# Patient Record
Sex: FEMALE | Race: OTHER | HISPANIC OR LATINO | URBAN - METROPOLITAN AREA
[De-identification: names, ages, dates, MRNs, and addresses within clinical notes are randomized per-mention and may not be internally consistent; named-entity substitution may affect disease eponyms.]

---

## 2017-02-01 ENCOUNTER — EMERGENCY (EMERGENCY)
Facility: HOSPITAL | Age: 37
LOS: 1 days | Discharge: HOME | End: 2017-02-01
Attending: EMERGENCY MEDICINE
Payer: MEDICAID

## 2017-02-01 VITALS
OXYGEN SATURATION: 99 % | DIASTOLIC BLOOD PRESSURE: 78 MMHG | HEART RATE: 73 BPM | TEMPERATURE: 99 F | SYSTOLIC BLOOD PRESSURE: 150 MMHG | RESPIRATION RATE: 18 BRPM

## 2017-02-01 VITALS
DIASTOLIC BLOOD PRESSURE: 80 MMHG | OXYGEN SATURATION: 98 % | TEMPERATURE: 99 F | SYSTOLIC BLOOD PRESSURE: 155 MMHG | RESPIRATION RATE: 18 BRPM | HEART RATE: 76 BPM | WEIGHT: 156.09 LBS | HEIGHT: 64 IN

## 2017-02-01 DIAGNOSIS — Z90.710 ACQUIRED ABSENCE OF BOTH CERVIX AND UTERUS: Chronic | ICD-10-CM

## 2017-02-01 DIAGNOSIS — Z98.89 OTHER SPECIFIED POSTPROCEDURAL STATES: Chronic | ICD-10-CM

## 2017-02-01 DIAGNOSIS — Z98.51 TUBAL LIGATION STATUS: Chronic | ICD-10-CM

## 2017-02-01 LAB
APPEARANCE UR: CLEAR — SIGNIFICANT CHANGE UP
BILIRUB UR-MCNC: NEGATIVE — SIGNIFICANT CHANGE UP
COLOR SPEC: YELLOW — SIGNIFICANT CHANGE UP
DIFF PNL FLD: NEGATIVE — SIGNIFICANT CHANGE UP
GLUCOSE UR QL: NEGATIVE MG/DL — SIGNIFICANT CHANGE UP
KETONES UR-MCNC: NEGATIVE — SIGNIFICANT CHANGE UP
LEUKOCYTE ESTERASE UR-ACNC: NEGATIVE — SIGNIFICANT CHANGE UP
NITRITE UR-MCNC: NEGATIVE — SIGNIFICANT CHANGE UP
PH UR: 6.5 — SIGNIFICANT CHANGE UP (ref 4.8–8)
PROT UR-MCNC: NEGATIVE MG/DL — SIGNIFICANT CHANGE UP
SP GR SPEC: 1.01 — SIGNIFICANT CHANGE UP (ref 1.01–1.02)
UROBILINOGEN FLD QL: NEGATIVE MG/DL — SIGNIFICANT CHANGE UP

## 2017-02-01 PROCEDURE — 99283 EMERGENCY DEPT VISIT LOW MDM: CPT

## 2017-02-01 PROCEDURE — 87086 URINE CULTURE/COLONY COUNT: CPT

## 2017-02-01 PROCEDURE — 87081 CULTURE SCREEN ONLY: CPT

## 2017-02-01 PROCEDURE — 87880 STREP A ASSAY W/OPTIC: CPT

## 2017-02-01 PROCEDURE — 81003 URINALYSIS AUTO W/O SCOPE: CPT

## 2017-02-01 RX ORDER — IBUPROFEN 200 MG
400 TABLET ORAL ONCE
Qty: 0 | Refills: 0 | Status: COMPLETED | OUTPATIENT
Start: 2017-02-01 | End: 2017-02-01

## 2017-02-01 RX ADMIN — Medication 400 MILLIGRAM(S): at 12:40

## 2017-02-01 NOTE — ED STATDOCS - ENMT, MLM
Posterior pharynx extremely erythematous.  No exudate. No airway occlusion. Neck supple, non-tender,  no palpable nodes or masses.

## 2017-02-01 NOTE — ED STATDOCS - OBJECTIVE STATEMENT
37 y/o female with PSHx partial hysterectomy and tonsillectomy presents to ED c/o left-sided throat pain x 2 days. Associated chills, cough, and left ear pain. Pt also notes intermittent left flank pain x 1.5 weeks with +dysuria. Denies fever, v/d. Pt has been treating with Tylenol for pain. No further complaints at this time.

## 2017-02-01 NOTE — ED STATDOCS - PROGRESS NOTE DETAILS
PA NOTE: Pt seen by intake physician and orders/plan reviewed. PT is a 35yo female with PMHx of total hysterectomy and tonsillectomy presenting to ED with complaints of sore throat and left ear pain x 1 week. Pt reports left sided throat pain with pain when swallowing. Pt endorses associated chills. Pt took motrin and tylenol to relieve symptoms. Pt also reports L sided flank pain with dysuria x 2 weeks. Pt denies fever, rash, NV/D, cough, rhinorrhea, hematuria, abd pain, CP, SOB, recent travel. NKDA  PE: GEN: Awake, alert,  NAD, HEENT: NC/AT. EARS: BL canals clear. BL TM intact pearly gray.  EYES:clear, without injection or icterus. no discharge. MOUTH: MMM. pharynx with erythema without exudate. uvula midline. NOSE: septum midline. no discharge noted.  CARDIAC: Reg rate and rhythm, S1,S2, RRR  RESP: No distress noted. Lungs CTA bilaterally no wheeze, ronchi, rales. ABD: soft, supple, non-tender, no guarding. no CVAT. NEURO: AOx3, no focal deficits   PLAN: Pt is a 35yo female with pharyngitis. rapid strep and UA still pending. Will re-evaluate. PA NOTE: Pt seen by intake physician and orders/plan reviewed. PT is a 37yo female with PMHx of total hysterectomy and tonsillectomy presenting to ED with complaints of sore throat and left ear pain x 1 week. Pt reports left sided throat pain with pain when swallowing. Pt endorses associated chills. Pt took motrin and tylenol to relieve symptoms. Pt also reports L sided flank pain with dysuria x 2 weeks. Pt denies fever, rash, NV/D, cough, rhinorrhea, hematuria, abd pain, CP, SOB, recent travel. NKDA  PE: GEN: Awake, alert,  NAD, HEENT: NC/AT. EARS: BL canals clear. BL TM intact pearly gray.  EYES:clear, without injection or icterus. no discharge. MOUTH: MMM. pharynx with erythema without exudate. uvula midline. NOSE: septum midline. no discharge noted.  NECK: supple. L tender cervical LAD. CARDIAC: Reg rate and rhythm, S1,S2, RRR  RESP: No distress noted. Lungs CTA bilaterally no wheeze, ronchi, rales. ABD: soft, supple, non-tender, no guarding. no CVAT. NEURO: AOx3, no focal deficits   PLAN: Pt is a 37yo female with pharyngitis. rapid strep and UA still pending. Will re-evaluate. Pt is stable. UA negative. rapid strep negative.Will give motrin in ED. Advise pt to follow up with PMD. Pt verbalized understanding and agreement with plan and diagnosis. Will d/c

## 2017-02-01 NOTE — ED ADULT NURSE NOTE - OBJECTIVE STATEMENT
assumed care at 1145.  pt awake alert and oriented x3.  pt c/o sore throat x2 days.  pt also states she has intermittent left flank pain and burning upon urination x1week.  denies N/V/D or fevers at home.  skin warm and dry.  moving all extremities well. respirations even and unlabored.

## 2017-02-01 NOTE — ED STATDOCS - NS ED MD SCRIBE ATTENDING SCRIBE SECTIONS
DISPOSITION/PHYSICAL EXAM/HIV/HISTORY OF PRESENT ILLNESS/REVIEW OF SYSTEMS/VITAL SIGNS( Pullset)/PAST MEDICAL/SURGICAL/SOCIAL HISTORY

## 2017-02-02 LAB
CULTURE RESULTS: SIGNIFICANT CHANGE UP
SPECIMEN SOURCE: SIGNIFICANT CHANGE UP

## 2017-02-03 LAB
CULTURE RESULTS: SIGNIFICANT CHANGE UP
SPECIMEN SOURCE: SIGNIFICANT CHANGE UP

## 2017-09-15 ENCOUNTER — OUTPATIENT (OUTPATIENT)
Dept: OUTPATIENT SERVICES | Facility: HOSPITAL | Age: 37
LOS: 1 days | End: 2017-09-15
Payer: MEDICAID

## 2017-09-15 VITALS
HEART RATE: 65 BPM | WEIGHT: 161.6 LBS | HEIGHT: 61 IN | SYSTOLIC BLOOD PRESSURE: 137 MMHG | TEMPERATURE: 99 F | DIASTOLIC BLOOD PRESSURE: 93 MMHG | RESPIRATION RATE: 16 BRPM

## 2017-09-15 DIAGNOSIS — Z90.710 ACQUIRED ABSENCE OF BOTH CERVIX AND UTERUS: Chronic | ICD-10-CM

## 2017-09-15 DIAGNOSIS — Z98.89 OTHER SPECIFIED POSTPROCEDURAL STATES: Chronic | ICD-10-CM

## 2017-09-15 DIAGNOSIS — Z98.51 TUBAL LIGATION STATUS: Chronic | ICD-10-CM

## 2017-09-15 DIAGNOSIS — N81.10 CYSTOCELE, UNSPECIFIED: ICD-10-CM

## 2017-09-15 DIAGNOSIS — Z01.818 ENCOUNTER FOR OTHER PREPROCEDURAL EXAMINATION: ICD-10-CM

## 2017-09-15 LAB
ALBUMIN SERPL ELPH-MCNC: 4.4 G/DL — SIGNIFICANT CHANGE UP (ref 3.3–5.2)
ALP SERPL-CCNC: 56 U/L — SIGNIFICANT CHANGE UP (ref 40–120)
ALT FLD-CCNC: 17 U/L — SIGNIFICANT CHANGE UP
ANION GAP SERPL CALC-SCNC: 13 MMOL/L — SIGNIFICANT CHANGE UP (ref 5–17)
APPEARANCE UR: CLEAR — SIGNIFICANT CHANGE UP
APTT BLD: 31.6 SEC — SIGNIFICANT CHANGE UP (ref 27.5–37.4)
AST SERPL-CCNC: 18 U/L — SIGNIFICANT CHANGE UP
BILIRUB SERPL-MCNC: 0.2 MG/DL — LOW (ref 0.4–2)
BILIRUB UR-MCNC: NEGATIVE — SIGNIFICANT CHANGE UP
BLD GP AB SCN SERPL QL: SIGNIFICANT CHANGE UP
BUN SERPL-MCNC: 15 MG/DL — SIGNIFICANT CHANGE UP (ref 8–20)
CALCIUM SERPL-MCNC: 8.9 MG/DL — SIGNIFICANT CHANGE UP (ref 8.6–10.2)
CHLORIDE SERPL-SCNC: 105 MMOL/L — SIGNIFICANT CHANGE UP (ref 98–107)
CO2 SERPL-SCNC: 23 MMOL/L — SIGNIFICANT CHANGE UP (ref 22–29)
COLOR SPEC: YELLOW — SIGNIFICANT CHANGE UP
CREAT SERPL-MCNC: 0.57 MG/DL — SIGNIFICANT CHANGE UP (ref 0.5–1.3)
DIFF PNL FLD: ABNORMAL
EPI CELLS # UR: SIGNIFICANT CHANGE UP
GLUCOSE SERPL-MCNC: 84 MG/DL — SIGNIFICANT CHANGE UP (ref 70–115)
GLUCOSE UR QL: NEGATIVE MG/DL — SIGNIFICANT CHANGE UP
HCT VFR BLD CALC: 38.1 % — SIGNIFICANT CHANGE UP (ref 37–47)
HGB BLD-MCNC: 12.5 G/DL — SIGNIFICANT CHANGE UP (ref 12–16)
INR BLD: 0.96 RATIO — SIGNIFICANT CHANGE UP (ref 0.88–1.16)
KETONES UR-MCNC: NEGATIVE — SIGNIFICANT CHANGE UP
LEUKOCYTE ESTERASE UR-ACNC: ABNORMAL
MCHC RBC-ENTMCNC: 28.9 PG — SIGNIFICANT CHANGE UP (ref 27–31)
MCHC RBC-ENTMCNC: 32.8 G/DL — SIGNIFICANT CHANGE UP (ref 32–36)
MCV RBC AUTO: 88.2 FL — SIGNIFICANT CHANGE UP (ref 81–99)
NITRITE UR-MCNC: NEGATIVE — SIGNIFICANT CHANGE UP
PH UR: 6 — SIGNIFICANT CHANGE UP (ref 5–8)
PLATELET # BLD AUTO: 350 K/UL — SIGNIFICANT CHANGE UP (ref 150–400)
POTASSIUM SERPL-MCNC: 4 MMOL/L — SIGNIFICANT CHANGE UP (ref 3.5–5.3)
POTASSIUM SERPL-SCNC: 4 MMOL/L — SIGNIFICANT CHANGE UP (ref 3.5–5.3)
PROT SERPL-MCNC: 7.2 G/DL — SIGNIFICANT CHANGE UP (ref 6.6–8.7)
PROT UR-MCNC: NEGATIVE MG/DL — SIGNIFICANT CHANGE UP
PROTHROM AB SERPL-ACNC: 10.5 SEC — SIGNIFICANT CHANGE UP (ref 9.8–12.7)
RBC # BLD: 4.32 M/UL — LOW (ref 4.4–5.2)
RBC # FLD: 12.6 % — SIGNIFICANT CHANGE UP (ref 11–15.6)
RBC CASTS # UR COMP ASSIST: ABNORMAL /HPF (ref 0–4)
SODIUM SERPL-SCNC: 141 MMOL/L — SIGNIFICANT CHANGE UP (ref 135–145)
SP GR SPEC: 1.01 — SIGNIFICANT CHANGE UP (ref 1.01–1.02)
TYPE + AB SCN PNL BLD: SIGNIFICANT CHANGE UP
UROBILINOGEN FLD QL: NEGATIVE MG/DL — SIGNIFICANT CHANGE UP
WBC # BLD: 10.8 K/UL — SIGNIFICANT CHANGE UP (ref 4.8–10.8)
WBC # FLD AUTO: 10.8 K/UL — SIGNIFICANT CHANGE UP (ref 4.8–10.8)
WBC UR QL: SIGNIFICANT CHANGE UP

## 2017-09-15 PROCEDURE — 85730 THROMBOPLASTIN TIME PARTIAL: CPT

## 2017-09-15 PROCEDURE — 81001 URINALYSIS AUTO W/SCOPE: CPT

## 2017-09-15 PROCEDURE — 85610 PROTHROMBIN TIME: CPT

## 2017-09-15 PROCEDURE — 80053 COMPREHEN METABOLIC PANEL: CPT

## 2017-09-15 PROCEDURE — 85027 COMPLETE CBC AUTOMATED: CPT

## 2017-09-15 PROCEDURE — 86850 RBC ANTIBODY SCREEN: CPT

## 2017-09-15 PROCEDURE — 93010 ELECTROCARDIOGRAM REPORT: CPT

## 2017-09-15 PROCEDURE — 87086 URINE CULTURE/COLONY COUNT: CPT

## 2017-09-15 PROCEDURE — 86900 BLOOD TYPING SEROLOGIC ABO: CPT

## 2017-09-15 PROCEDURE — 87186 SC STD MICRODIL/AGAR DIL: CPT

## 2017-09-15 PROCEDURE — G0463: CPT

## 2017-09-15 PROCEDURE — 36415 COLL VENOUS BLD VENIPUNCTURE: CPT

## 2017-09-15 PROCEDURE — 86901 BLOOD TYPING SEROLOGIC RH(D): CPT

## 2017-09-15 PROCEDURE — 93005 ELECTROCARDIOGRAM TRACING: CPT

## 2017-09-15 RX ORDER — HYDROCHLOROTHIAZIDE 25 MG
0 TABLET ORAL
Qty: 0 | Refills: 0 | COMMUNITY

## 2017-09-15 NOTE — H&P PST ADULT - NEGATIVE GENERAL GENITOURINARY SYMPTOMS
no renal colic/no dysuria/no urinary hesitancy/normal urinary frequency/no flank pain R/no urine discoloration/no nocturia/no gas in urine/no hematuria/no flank pain L/no bladder infections

## 2017-09-15 NOTE — H&P PST ADULT - NEGATIVE OPHTHALMOLOGIC SYMPTOMS
no lacrimation L/no diplopia/no blurred vision R/no lacrimation R/no blurred vision L/no discharge R/no pain L/no irritation R/no loss of vision R/no discharge L/no irritation L/no scleral injection R/no photophobia/no pain R/no loss of vision L/no scleral injection L

## 2017-09-15 NOTE — H&P PST ADULT - NEGATIVE FEMALE-SPECIFIC SYMPTOMS
no amenorrhea/no spotting/no pelvic pain/not applicable/no irregular menses/no vaginal discharge/no dysmenorrhea/no menorrhagia/no abnormal vaginal bleeding

## 2017-09-15 NOTE — H&P PST ADULT - NEGATIVE PSYCHIATRIC SYMPTOMS
no suicidal ideation/no memory loss/no auditory hallucinations/no visual hallucinations/no hyperactivity/no paranoia/no mood swings/no depression/no anxiety/no insomnia/no agitation

## 2017-09-15 NOTE — H&P PST ADULT - NEGATIVE SKIN SYMPTOMS
no rash/no hair loss/no dryness/no brittle nails/no itching/no change in size/color of mole/no tumor/no pitted nails

## 2017-09-15 NOTE — H&P PST ADULT - HISTORY OF PRESENT ILLNESS
36 y/o female s/p hysterectomy c/o female stress incontinence patient now presents for vaginal sling operation

## 2017-09-15 NOTE — H&P PST ADULT - NEGATIVE CARDIOVASCULAR SYMPTOMS
no palpitations/no orthopnea/no paroxysmal nocturnal dyspnea/no dyspnea on exertion/no peripheral edema/no chest pain

## 2017-09-15 NOTE — H&P PST ADULT - FAMILY HISTORY
Mother  Still living? Yes, Estimated age: 51-60  Family history of premature CAD, Age at diagnosis: Age Unknown  Family history of myocardial infarction at age less than 60, Age at diagnosis: Age Unknown

## 2017-09-15 NOTE — H&P PST ADULT - NEGATIVE BREAST SYMPTOMS
no nipple discharge R/no breast tenderness L/no breast lump L/no breast tenderness R/no breast lump R/no nipple discharge L

## 2017-09-15 NOTE — H&P PST ADULT - NSANTHOSAYNRD_GEN_A_CORE
No. MALDONADO screening performed.  STOP BANG Legend: 0-2 = LOW Risk; 3-4 = INTERMEDIATE Risk; 5-8 = HIGH Risk

## 2017-09-15 NOTE — H&P PST ADULT - MUSCULOSKELETAL
details… detailed exam no calf tenderness/normal strength/normal/ROM intact/no joint warmth/no joint swelling/no joint erythema

## 2017-09-15 NOTE — H&P PST ADULT - GASTROINTESTINAL DETAILS
normal/soft/no rebound tenderness/no rigidity/no distention/nontender/no masses palpable/no guarding/bowel sounds normal/no organomegaly/no bruit

## 2017-09-15 NOTE — H&P PST ADULT - NEGATIVE NEUROLOGICAL SYMPTOMS
no difficulty walking/no paresthesias/no syncope/no headache/no confusion/no facial palsy/no transient paralysis/no tremors/no weakness/no generalized seizures/no focal seizures/no loss of consciousness/no hemiparesis/no vertigo/no loss of sensation

## 2017-09-15 NOTE — H&P PST ADULT - NEGATIVE ENMT SYMPTOMS
no sinus symptoms/no hearing difficulty/no vertigo/no nasal congestion/no gum bleeding/no throat pain/no nasal discharge/no recurrent cold sores/no nasal obstruction/no post-nasal discharge/no abnormal taste sensation/no dry mouth/no ear pain/no tinnitus/no nose bleeds/no dysphagia

## 2017-09-15 NOTE — H&P PST ADULT - NEUROLOGICAL DETAILS
sensation intact/deep reflexes intact/cranial nerves intact/alert and oriented x 3/superficial reflexes intact/no spontaneous movement/responds to pain/responds to verbal commands/normal strength

## 2017-09-15 NOTE — H&P PST ADULT - NEGATIVE GASTROINTESTINAL SYMPTOMS
no vomiting/no diarrhea/no constipation/no abdominal pain/no melena/no jaundice/no nausea/no change in bowel habits/no flatulence/no hematochezia/no steatorrhea/no hiccoughs

## 2017-09-15 NOTE — H&P PST ADULT - NEGATIVE MUSCULOSKELETAL SYMPTOMS
no joint swelling/no muscle weakness/no muscle cramps/no leg pain L/no myalgia/no back pain/no arthralgia/no arthritis/no stiffness/no neck pain/no arm pain L/no arm pain R/no leg pain R

## 2017-09-15 NOTE — H&P PST ADULT - RS GEN PE MLT RESP DETAILS PC
no wheezes/normal/airway patent/breath sounds equal/good air movement/clear to auscultation bilaterally/no rales/respirations non-labored/no intercostal retractions/no chest wall tenderness/no rhonchi/no subcutaneous emphysema

## 2017-10-05 NOTE — ED ADULT NURSE NOTE - RESPIRATORY WDL
I have reviewed and confirmed nurses' notes...
Breathing spontaneous and unlabored. Breath sounds clear and equal bilaterally with regular rhythm.

## 2018-04-16 ENCOUNTER — EMERGENCY (EMERGENCY)
Facility: HOSPITAL | Age: 38
LOS: 1 days | Discharge: HOME | End: 2018-04-16
Attending: EMERGENCY MEDICINE
Payer: MEDICAID

## 2018-04-16 VITALS — HEIGHT: 64 IN | WEIGHT: 164.91 LBS

## 2018-04-16 VITALS
HEART RATE: 69 BPM | RESPIRATION RATE: 18 BRPM | DIASTOLIC BLOOD PRESSURE: 105 MMHG | TEMPERATURE: 98 F | OXYGEN SATURATION: 97 % | SYSTOLIC BLOOD PRESSURE: 158 MMHG

## 2018-04-16 DIAGNOSIS — Z98.51 TUBAL LIGATION STATUS: Chronic | ICD-10-CM

## 2018-04-16 DIAGNOSIS — Z90.710 ACQUIRED ABSENCE OF BOTH CERVIX AND UTERUS: Chronic | ICD-10-CM

## 2018-04-16 DIAGNOSIS — Z98.89 OTHER SPECIFIED POSTPROCEDURAL STATES: Chronic | ICD-10-CM

## 2018-04-16 LAB
APPEARANCE UR: CLEAR — SIGNIFICANT CHANGE UP
BILIRUB UR-MCNC: NEGATIVE — SIGNIFICANT CHANGE UP
COLOR SPEC: YELLOW — SIGNIFICANT CHANGE UP
DIFF PNL FLD: ABNORMAL
EPI CELLS # UR: SIGNIFICANT CHANGE UP
GLUCOSE UR QL: NEGATIVE MG/DL — SIGNIFICANT CHANGE UP
HCG UR QL: NEGATIVE — SIGNIFICANT CHANGE UP
KETONES UR-MCNC: NEGATIVE — SIGNIFICANT CHANGE UP
LEUKOCYTE ESTERASE UR-ACNC: NEGATIVE — SIGNIFICANT CHANGE UP
NITRITE UR-MCNC: NEGATIVE — SIGNIFICANT CHANGE UP
PH UR: 7 — SIGNIFICANT CHANGE UP (ref 5–8)
PROT UR-MCNC: NEGATIVE MG/DL — SIGNIFICANT CHANGE UP
S PYO AG SPEC QL IA: NEGATIVE — SIGNIFICANT CHANGE UP
SP GR SPEC: 1 — LOW (ref 1.01–1.02)
UROBILINOGEN FLD QL: NEGATIVE MG/DL — SIGNIFICANT CHANGE UP
WBC UR QL: SIGNIFICANT CHANGE UP

## 2018-04-16 PROCEDURE — 81001 URINALYSIS AUTO W/SCOPE: CPT

## 2018-04-16 PROCEDURE — 87081 CULTURE SCREEN ONLY: CPT

## 2018-04-16 PROCEDURE — 81025 URINE PREGNANCY TEST: CPT

## 2018-04-16 PROCEDURE — 99283 EMERGENCY DEPT VISIT LOW MDM: CPT

## 2018-04-16 PROCEDURE — 87880 STREP A ASSAY W/OPTIC: CPT

## 2018-04-16 NOTE — ED ADULT TRIAGE NOTE - CHIEF COMPLAINT QUOTE
"I haven't been feeling well since Saturday, I have lower back pain on my left side and my urine is very dark I also have a sore throat and ear pain" PT A & OX4 respirations even & unlabored,

## 2018-04-16 NOTE — ED ADULT NURSE NOTE - OBJECTIVE STATEMENT
Patient arrived to ED today with c/o left lower back pain.  Patient c/o throat pain, ear pain, and her urine is dark.

## 2018-04-16 NOTE — ED STATDOCS - OBJECTIVE STATEMENT
body aches and sharp pain in left lower back on saturday.  Took motrin 800mg and felt better.  Was ok yesterday.  Today with right ear pain, throat pain and dark yellow urine.  Feeling feverish.  Denies dysuria, denies fouls smelling urine.   Denies cough or runny nose.

## 2018-04-16 NOTE — ED STATDOCS - PLAN OF CARE
Tylenol extra strength 2 tablets every 4 hours or Ibuprofen 600mg (3 tablets) every 6 hours as needed for aches, pains. Warm salt water gargles as desired.  Return immediately to the ER for re-evaluation if your symptoms recur or worsening.

## 2018-04-16 NOTE — ED STATDOCS - CARE PLAN
Principal Discharge DX:	Pharyngitis, acute  Assessment and plan of treatment:	Tylenol extra strength 2 tablets every 4 hours or Ibuprofen 600mg (3 tablets) every 6 hours as needed for aches, pains. Warm salt water gargles as desired.  Return immediately to the ER for re-evaluation if your symptoms recur or worsening.

## 2018-04-18 LAB
CULTURE RESULTS: SIGNIFICANT CHANGE UP
SPECIMEN SOURCE: SIGNIFICANT CHANGE UP

## 2018-08-26 ENCOUNTER — EMERGENCY (EMERGENCY)
Facility: HOSPITAL | Age: 38
LOS: 1 days | Discharge: HOME | End: 2018-08-26
Attending: EMERGENCY MEDICINE
Payer: COMMERCIAL

## 2018-08-26 VITALS
HEART RATE: 62 BPM | HEIGHT: 64 IN | WEIGHT: 158.95 LBS | OXYGEN SATURATION: 100 % | DIASTOLIC BLOOD PRESSURE: 100 MMHG | RESPIRATION RATE: 18 BRPM | SYSTOLIC BLOOD PRESSURE: 159 MMHG | TEMPERATURE: 98 F

## 2018-08-26 DIAGNOSIS — Z98.89 OTHER SPECIFIED POSTPROCEDURAL STATES: Chronic | ICD-10-CM

## 2018-08-26 DIAGNOSIS — Z90.710 ACQUIRED ABSENCE OF BOTH CERVIX AND UTERUS: Chronic | ICD-10-CM

## 2018-08-26 DIAGNOSIS — Z98.51 TUBAL LIGATION STATUS: Chronic | ICD-10-CM

## 2018-08-26 PROBLEM — N20.0 CALCULUS OF KIDNEY: Chronic | Status: ACTIVE | Noted: 2017-09-15

## 2018-08-26 PROCEDURE — 70160 X-RAY EXAM OF NASAL BONES: CPT | Mod: 26

## 2018-08-26 PROCEDURE — 99283 EMERGENCY DEPT VISIT LOW MDM: CPT

## 2018-08-26 PROCEDURE — 70160 X-RAY EXAM OF NASAL BONES: CPT

## 2018-08-26 PROCEDURE — 99283 EMERGENCY DEPT VISIT LOW MDM: CPT | Mod: 25

## 2018-08-26 PROCEDURE — 90471 IMMUNIZATION ADMIN: CPT

## 2018-08-26 PROCEDURE — 90715 TDAP VACCINE 7 YRS/> IM: CPT

## 2018-08-26 RX ORDER — ACETAMINOPHEN 500 MG
650 TABLET ORAL ONCE
Qty: 0 | Refills: 0 | Status: COMPLETED | OUTPATIENT
Start: 2018-08-26 | End: 2018-08-26

## 2018-08-26 RX ORDER — IBUPROFEN 200 MG
600 TABLET ORAL ONCE
Qty: 0 | Refills: 0 | Status: COMPLETED | OUTPATIENT
Start: 2018-08-26 | End: 2018-08-26

## 2018-08-26 RX ORDER — IBUPROFEN 200 MG
1 TABLET ORAL
Qty: 28 | Refills: 0 | OUTPATIENT
Start: 2018-08-26 | End: 2018-09-01

## 2018-08-26 RX ORDER — TETANUS TOXOID, REDUCED DIPHTHERIA TOXOID AND ACELLULAR PERTUSSIS VACCINE, ADSORBED 5; 2.5; 8; 8; 2.5 [IU]/.5ML; [IU]/.5ML; UG/.5ML; UG/.5ML; UG/.5ML
0.5 SUSPENSION INTRAMUSCULAR ONCE
Qty: 0 | Refills: 0 | Status: COMPLETED | OUTPATIENT
Start: 2018-08-26 | End: 2018-08-26

## 2018-08-26 RX ADMIN — Medication 650 MILLIGRAM(S): at 12:12

## 2018-08-26 RX ADMIN — TETANUS TOXOID, REDUCED DIPHTHERIA TOXOID AND ACELLULAR PERTUSSIS VACCINE, ADSORBED 0.5 MILLILITER(S): 5; 2.5; 8; 8; 2.5 SUSPENSION INTRAMUSCULAR at 12:12

## 2018-08-26 RX ADMIN — Medication 600 MILLIGRAM(S): at 10:54

## 2018-08-26 RX ADMIN — Medication 1 TABLET(S): at 10:55

## 2018-08-26 NOTE — ED STATDOCS - OBJECTIVE STATEMENT
37 y/o female presents to the ED s/p bite from dog which onset 3 days ago. Pt states that her dog bit her on her face, when she woke him up from sleep. Dog is up-to-date with his vaccinations. Denies hand pain, fever, chills, chance of being pregnant or rash. No further complaints at this time.

## 2018-08-26 NOTE — ED STATDOCS - PROGRESS NOTE DETAILS
Pt reeval- reports continued pain- Will add tylenol. dT- unknown. XR neg for fx. Will dc home on Augmentin, Motrin and Tylenol. Educated on local wound care
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
## 2019-02-13 ENCOUNTER — EMERGENCY (EMERGENCY)
Facility: HOSPITAL | Age: 39
LOS: 1 days | Discharge: HOME | End: 2019-02-13
Attending: STUDENT IN AN ORGANIZED HEALTH CARE EDUCATION/TRAINING PROGRAM
Payer: COMMERCIAL

## 2019-02-13 VITALS
SYSTOLIC BLOOD PRESSURE: 154 MMHG | TEMPERATURE: 98 F | HEART RATE: 77 BPM | RESPIRATION RATE: 18 BRPM | OXYGEN SATURATION: 99 % | HEIGHT: 64 IN | DIASTOLIC BLOOD PRESSURE: 86 MMHG | WEIGHT: 149.91 LBS

## 2019-02-13 DIAGNOSIS — Z98.51 TUBAL LIGATION STATUS: Chronic | ICD-10-CM

## 2019-02-13 DIAGNOSIS — Z98.89 OTHER SPECIFIED POSTPROCEDURAL STATES: Chronic | ICD-10-CM

## 2019-02-13 DIAGNOSIS — Z90.710 ACQUIRED ABSENCE OF BOTH CERVIX AND UTERUS: Chronic | ICD-10-CM

## 2019-02-13 LAB
ALBUMIN SERPL ELPH-MCNC: 4.2 G/DL — SIGNIFICANT CHANGE UP (ref 3.3–5.2)
ALP SERPL-CCNC: 48 U/L — SIGNIFICANT CHANGE UP (ref 40–120)
ALT FLD-CCNC: 19 U/L — SIGNIFICANT CHANGE UP
ANION GAP SERPL CALC-SCNC: 13 MMOL/L — SIGNIFICANT CHANGE UP (ref 5–17)
APPEARANCE UR: CLEAR — SIGNIFICANT CHANGE UP
AST SERPL-CCNC: 18 U/L — SIGNIFICANT CHANGE UP
BASOPHILS # BLD AUTO: 0 K/UL — SIGNIFICANT CHANGE UP (ref 0–0.2)
BASOPHILS NFR BLD AUTO: 0.5 % — SIGNIFICANT CHANGE UP (ref 0–2)
BILIRUB SERPL-MCNC: 0.4 MG/DL — SIGNIFICANT CHANGE UP (ref 0.4–2)
BILIRUB UR-MCNC: NEGATIVE — SIGNIFICANT CHANGE UP
BUN SERPL-MCNC: 15 MG/DL — SIGNIFICANT CHANGE UP (ref 8–20)
CALCIUM SERPL-MCNC: 9.1 MG/DL — SIGNIFICANT CHANGE UP (ref 8.6–10.2)
CHLORIDE SERPL-SCNC: 102 MMOL/L — SIGNIFICANT CHANGE UP (ref 98–107)
CO2 SERPL-SCNC: 24 MMOL/L — SIGNIFICANT CHANGE UP (ref 22–29)
COLOR SPEC: YELLOW — SIGNIFICANT CHANGE UP
CREAT SERPL-MCNC: 0.68 MG/DL — SIGNIFICANT CHANGE UP (ref 0.5–1.3)
DIFF PNL FLD: NEGATIVE — SIGNIFICANT CHANGE UP
EOSINOPHIL # BLD AUTO: 0.1 K/UL — SIGNIFICANT CHANGE UP (ref 0–0.5)
EOSINOPHIL NFR BLD AUTO: 1.4 % — SIGNIFICANT CHANGE UP (ref 0–6)
GLUCOSE SERPL-MCNC: 102 MG/DL — SIGNIFICANT CHANGE UP (ref 70–115)
GLUCOSE UR QL: NEGATIVE MG/DL — SIGNIFICANT CHANGE UP
HCG UR QL: NEGATIVE — SIGNIFICANT CHANGE UP
HCT VFR BLD CALC: 39.7 % — SIGNIFICANT CHANGE UP (ref 37–47)
HGB BLD-MCNC: 13.3 G/DL — SIGNIFICANT CHANGE UP (ref 12–16)
KETONES UR-MCNC: NEGATIVE — SIGNIFICANT CHANGE UP
LEUKOCYTE ESTERASE UR-ACNC: NEGATIVE — SIGNIFICANT CHANGE UP
LIDOCAIN IGE QN: 39 U/L — SIGNIFICANT CHANGE UP (ref 22–51)
LYMPHOCYTES # BLD AUTO: 1.5 K/UL — SIGNIFICANT CHANGE UP (ref 1–4.8)
LYMPHOCYTES # BLD AUTO: 22.2 % — SIGNIFICANT CHANGE UP (ref 20–55)
MCHC RBC-ENTMCNC: 29.5 PG — SIGNIFICANT CHANGE UP (ref 27–31)
MCHC RBC-ENTMCNC: 33.5 G/DL — SIGNIFICANT CHANGE UP (ref 32–36)
MCV RBC AUTO: 88 FL — SIGNIFICANT CHANGE UP (ref 81–99)
MONOCYTES # BLD AUTO: 0.4 K/UL — SIGNIFICANT CHANGE UP (ref 0–0.8)
MONOCYTES NFR BLD AUTO: 6.2 % — SIGNIFICANT CHANGE UP (ref 3–10)
NEUTROPHILS # BLD AUTO: 4.6 K/UL — SIGNIFICANT CHANGE UP (ref 1.8–8)
NEUTROPHILS NFR BLD AUTO: 69.5 % — SIGNIFICANT CHANGE UP (ref 37–73)
NITRITE UR-MCNC: NEGATIVE — SIGNIFICANT CHANGE UP
PH UR: 6.5 — SIGNIFICANT CHANGE UP (ref 5–8)
PLATELET # BLD AUTO: 336 K/UL — SIGNIFICANT CHANGE UP (ref 150–400)
POTASSIUM SERPL-MCNC: 4 MMOL/L — SIGNIFICANT CHANGE UP (ref 3.5–5.3)
POTASSIUM SERPL-SCNC: 4 MMOL/L — SIGNIFICANT CHANGE UP (ref 3.5–5.3)
PROT SERPL-MCNC: 6.8 G/DL — SIGNIFICANT CHANGE UP (ref 6.6–8.7)
PROT UR-MCNC: NEGATIVE MG/DL — SIGNIFICANT CHANGE UP
RBC # BLD: 4.51 M/UL — SIGNIFICANT CHANGE UP (ref 4.4–5.2)
RBC # FLD: 12.4 % — SIGNIFICANT CHANGE UP (ref 11–15.6)
SODIUM SERPL-SCNC: 139 MMOL/L — SIGNIFICANT CHANGE UP (ref 135–145)
SP GR SPEC: 1.01 — SIGNIFICANT CHANGE UP (ref 1.01–1.02)
UROBILINOGEN FLD QL: NEGATIVE MG/DL — SIGNIFICANT CHANGE UP
WBC # BLD: 6.7 K/UL — SIGNIFICANT CHANGE UP (ref 4.8–10.8)
WBC # FLD AUTO: 6.7 K/UL — SIGNIFICANT CHANGE UP (ref 4.8–10.8)

## 2019-02-13 PROCEDURE — 76856 US EXAM PELVIC COMPLETE: CPT | Mod: 26

## 2019-02-13 PROCEDURE — 76830 TRANSVAGINAL US NON-OB: CPT

## 2019-02-13 PROCEDURE — 36415 COLL VENOUS BLD VENIPUNCTURE: CPT

## 2019-02-13 PROCEDURE — 81025 URINE PREGNANCY TEST: CPT

## 2019-02-13 PROCEDURE — 76830 TRANSVAGINAL US NON-OB: CPT | Mod: 26

## 2019-02-13 PROCEDURE — 74176 CT ABD & PELVIS W/O CONTRAST: CPT

## 2019-02-13 PROCEDURE — 74176 CT ABD & PELVIS W/O CONTRAST: CPT | Mod: 26

## 2019-02-13 PROCEDURE — 76856 US EXAM PELVIC COMPLETE: CPT

## 2019-02-13 PROCEDURE — 99285 EMERGENCY DEPT VISIT HI MDM: CPT

## 2019-02-13 PROCEDURE — 87086 URINE CULTURE/COLONY COUNT: CPT

## 2019-02-13 PROCEDURE — 81003 URINALYSIS AUTO W/O SCOPE: CPT

## 2019-02-13 PROCEDURE — 96374 THER/PROPH/DIAG INJ IV PUSH: CPT

## 2019-02-13 PROCEDURE — 85027 COMPLETE CBC AUTOMATED: CPT

## 2019-02-13 PROCEDURE — 96375 TX/PRO/DX INJ NEW DRUG ADDON: CPT

## 2019-02-13 PROCEDURE — 80053 COMPREHEN METABOLIC PANEL: CPT

## 2019-02-13 PROCEDURE — 83690 ASSAY OF LIPASE: CPT

## 2019-02-13 PROCEDURE — 99284 EMERGENCY DEPT VISIT MOD MDM: CPT | Mod: 25

## 2019-02-13 RX ORDER — IBUPROFEN 200 MG
1 TABLET ORAL
Qty: 15 | Refills: 0
Start: 2019-02-13 | End: 2019-02-17

## 2019-02-13 RX ORDER — SODIUM CHLORIDE 9 MG/ML
1000 INJECTION INTRAMUSCULAR; INTRAVENOUS; SUBCUTANEOUS ONCE
Qty: 0 | Refills: 0 | Status: COMPLETED | OUTPATIENT
Start: 2019-02-13 | End: 2019-02-13

## 2019-02-13 RX ORDER — ACETAMINOPHEN 500 MG
650 TABLET ORAL ONCE
Qty: 0 | Refills: 0 | Status: COMPLETED | OUTPATIENT
Start: 2019-02-13 | End: 2019-02-13

## 2019-02-13 RX ORDER — KETOROLAC TROMETHAMINE 30 MG/ML
30 SYRINGE (ML) INJECTION ONCE
Qty: 0 | Refills: 0 | Status: DISCONTINUED | OUTPATIENT
Start: 2019-02-13 | End: 2019-02-13

## 2019-02-13 RX ORDER — SODIUM CHLORIDE 9 MG/ML
3 INJECTION INTRAMUSCULAR; INTRAVENOUS; SUBCUTANEOUS ONCE
Qty: 0 | Refills: 0 | Status: COMPLETED | OUTPATIENT
Start: 2019-02-13 | End: 2019-02-13

## 2019-02-13 RX ORDER — TRAMADOL HYDROCHLORIDE 50 MG/1
1 TABLET ORAL
Qty: 12 | Refills: 0
Start: 2019-02-13 | End: 2019-02-15

## 2019-02-13 RX ORDER — MORPHINE SULFATE 50 MG/1
6 CAPSULE, EXTENDED RELEASE ORAL ONCE
Qty: 0 | Refills: 0 | Status: DISCONTINUED | OUTPATIENT
Start: 2019-02-13 | End: 2019-02-13

## 2019-02-13 RX ADMIN — Medication 30 MILLIGRAM(S): at 10:30

## 2019-02-13 RX ADMIN — MORPHINE SULFATE 6 MILLIGRAM(S): 50 CAPSULE, EXTENDED RELEASE ORAL at 12:30

## 2019-02-13 RX ADMIN — Medication 30 MILLIGRAM(S): at 10:20

## 2019-02-13 RX ADMIN — SODIUM CHLORIDE 1000 MILLILITER(S): 9 INJECTION INTRAMUSCULAR; INTRAVENOUS; SUBCUTANEOUS at 10:20

## 2019-02-13 RX ADMIN — Medication 650 MILLIGRAM(S): at 16:53

## 2019-02-13 RX ADMIN — SODIUM CHLORIDE 3 MILLILITER(S): 9 INJECTION INTRAMUSCULAR; INTRAVENOUS; SUBCUTANEOUS at 10:20

## 2019-02-13 RX ADMIN — MORPHINE SULFATE 6 MILLIGRAM(S): 50 CAPSULE, EXTENDED RELEASE ORAL at 12:09

## 2019-02-13 NOTE — ED STATDOCS - NS ED ROS FT
No fever/chills, No photophobia/eye pain/changes in vision, No ear pain/sore throat/dysphagia, No chest pain/palpitations, no SOB/cough/wheeze/stridor, (+) abdominal pain, No N/V/D, no dysuria/frequency/discharge, (+) back pain No neck, no rash, no changes in neurological status/function.

## 2019-02-13 NOTE — ED STATDOCS - OBJECTIVE STATEMENT
39 y/o F with PMHx of HTN and renal calculus presents to the ED c/o intermittent left sided abdominal pain, onset 1 day ago. Pt reports one episode of numbness to left arm that lasted for 1 minute; took Motrin with some relief of symptoms; last dose this morning. Pt states" I thought it was gas"; describes symptoms as stabbing sensation that radiates to her back. Denies being pregnant. Denies nausea, vomiting, diarrhea, fever, dysuria. No further complaints at this time.

## 2019-02-13 NOTE — ED ADULT NURSE NOTE - NSIMPLEMENTINTERV_GEN_ALL_ED
Implemented All Universal Safety Interventions:  [place] to call system. Call bell, personal items and telephone within reach. Instruct patient to call for assistance. Room bathroom lighting operational. Non-slip footwear when patient is off stretcher. Physically safe environment: no spills, clutter or unnecessary equipment. Stretcher in lowest position, wheels locked, appropriate side rails in place.

## 2019-02-13 NOTE — ED STATDOCS - CLINICAL SUMMARY MEDICAL DECISION MAKING FREE TEXT BOX
Pt with upper quadrant/ left flank pain started last night will check labs, CT to r/o kidney stones. Give IV fluids pain med and re-assess

## 2019-02-13 NOTE — ED STATDOCS - PHYSICAL EXAMINATION
Constitutional - well-developed; well nourished. Head - NCAT. Airway patent. Eyes - PERRL. CV - RRR. no murmur. no edema. Pulm - CTAB. Abd - soft, mild upper epigastric tenderness to palpation.. no rebound. no guarding. Neuro - A&Ox3. strength 5/5 x4. sensation intact x4. normal gait. Skin - No rash. MSK - normal ROM

## 2019-02-13 NOTE — ED STATDOCS - PROGRESS NOTE DETAILS
NP NOTE:  HPI, ROS, PE of intake doctor reviewed.  Sudden onset LUQ and left flank pain at 8pm last night.  On exam + CVA tenderness. UA unremarkable, labs and CT reviewed. NP NOTE:  Labs and Ct reviewed, 3.7cm ovarian cyst, patient crying in pain, will obtain US to r/o torsion.  I s/w US they will send for patient now. NP NOTE:  I s/w Dr. Harris, US with hemorrhagic cyst, no evidence of torsion.  Pain is controlled, will d/c home f/u with Dr. Valle.

## 2019-02-14 LAB
CULTURE RESULTS: NO GROWTH — SIGNIFICANT CHANGE UP
SPECIMEN SOURCE: SIGNIFICANT CHANGE UP

## 2019-02-18 ENCOUNTER — EMERGENCY (EMERGENCY)
Facility: HOSPITAL | Age: 39
LOS: 1 days | Discharge: HOME | End: 2019-02-18
Attending: EMERGENCY MEDICINE
Payer: COMMERCIAL

## 2019-02-18 VITALS
HEIGHT: 63 IN | SYSTOLIC BLOOD PRESSURE: 126 MMHG | HEART RATE: 79 BPM | TEMPERATURE: 98 F | WEIGHT: 154.1 LBS | RESPIRATION RATE: 18 BRPM | OXYGEN SATURATION: 97 % | DIASTOLIC BLOOD PRESSURE: 82 MMHG

## 2019-02-18 DIAGNOSIS — Z98.51 TUBAL LIGATION STATUS: Chronic | ICD-10-CM

## 2019-02-18 DIAGNOSIS — Z98.89 OTHER SPECIFIED POSTPROCEDURAL STATES: Chronic | ICD-10-CM

## 2019-02-18 DIAGNOSIS — Z90.710 ACQUIRED ABSENCE OF BOTH CERVIX AND UTERUS: Chronic | ICD-10-CM

## 2019-02-18 PROCEDURE — 99284 EMERGENCY DEPT VISIT MOD MDM: CPT

## 2019-02-18 PROCEDURE — 76856 US EXAM PELVIC COMPLETE: CPT | Mod: 26

## 2019-02-18 PROCEDURE — 76830 TRANSVAGINAL US NON-OB: CPT | Mod: 26

## 2019-02-18 RX ORDER — SODIUM CHLORIDE 9 MG/ML
3 INJECTION INTRAMUSCULAR; INTRAVENOUS; SUBCUTANEOUS ONCE
Qty: 0 | Refills: 0 | Status: COMPLETED | OUTPATIENT
Start: 2019-02-18 | End: 2019-02-18

## 2019-02-18 RX ORDER — CYCLOBENZAPRINE HYDROCHLORIDE 10 MG/1
10 TABLET, FILM COATED ORAL ONCE
Qty: 0 | Refills: 0 | Status: COMPLETED | OUTPATIENT
Start: 2019-02-18 | End: 2019-02-18

## 2019-02-18 RX ORDER — KETOROLAC TROMETHAMINE 30 MG/ML
30 SYRINGE (ML) INJECTION ONCE
Qty: 0 | Refills: 0 | Status: DISCONTINUED | OUTPATIENT
Start: 2019-02-18 | End: 2019-02-18

## 2019-02-18 NOTE — ED STATDOCS - ATTENDING CONTRIBUTION TO CARE
I, Faheem Torres, performed the initial face to face bedside interview with this patient regarding history of present illness, review of symptoms and relevant past medical, social and family history.  I completed an independent physical examination.  I was the initial provider who evaluated this patient. I have signed out the follow up of any pending tests (i.e. labs, radiological studies) to the ACP.  I have communicated the patient’s plan of care and disposition with the ACP.  The history, relevant review of systems, past medical and surgical history, medical decision making, and physical examination was documented by the scribe in my presence and I attest to the accuracy of the documentation.

## 2019-02-18 NOTE — ED ADULT TRIAGE NOTE - CHIEF COMPLAINT QUOTE
"I feel very bloated and pain in my back" "I have a "I have a cyst on my left ovary" c/o back pain and bloating starting Friday and difficulty urinating. Reports known Left ovarian cyst. Denies burning during urination. Pt states she contacted MD Valle who told her to come to ED. Denies vaginal bleeding. Able to ambulate with steady gait noted

## 2019-02-18 NOTE — ED STATDOCS - OBJECTIVE STATEMENT
37 y/o F pt with PMHx of HTN and renal calculi presents to the ED c/o back pain. Reports Denies No further complaints at this time.    I feel very bloated and pain in my back" "I have a "I have a cyst on my left ovary" c/o back pain and bloating starting Friday and difficulty urinating. Reports known Left ovarian cyst. Denies burning during urination. Pt states she contacted MD Valle who told her to come to ED. Denies vaginal bleeding. Able to ambulate with steady gait noted 39 y/o F pt with significant PMHx of HTN and renal calculi presents to the ED c/o lower back pain onset Friday. Pt states she contacted her OBGYN, Dr. Valle, who sent her to the ED for further evaluation. Pain radiates down her legs. Reports abdominal bloating, difficulty urinating,  Pt took Tramadol and Motrin which provided minimal to no relief. She states she has a cyst on her L ovary. Pt is able to ambulate. Denies burning upon urination, or vaginal bleeding. PSHx of partial hysterectomy. No allergies. No further complaints at this time.

## 2019-02-19 PROCEDURE — 99284 EMERGENCY DEPT VISIT MOD MDM: CPT | Mod: 25

## 2019-02-19 PROCEDURE — 76856 US EXAM PELVIC COMPLETE: CPT

## 2019-02-19 PROCEDURE — 96374 THER/PROPH/DIAG INJ IV PUSH: CPT

## 2019-02-19 PROCEDURE — 76830 TRANSVAGINAL US NON-OB: CPT

## 2019-02-19 RX ADMIN — SODIUM CHLORIDE 3 MILLILITER(S): 9 INJECTION INTRAMUSCULAR; INTRAVENOUS; SUBCUTANEOUS at 00:20

## 2019-02-19 RX ADMIN — Medication 30 MILLIGRAM(S): at 00:21

## 2019-02-19 RX ADMIN — CYCLOBENZAPRINE HYDROCHLORIDE 10 MILLIGRAM(S): 10 TABLET, FILM COATED ORAL at 00:21

## 2019-02-19 NOTE — ED ADULT NURSE NOTE - OBJECTIVE STATEMENT
Reports intermittent lower back pain since Wednesday with worsening pain, reports hx of left ovarian cyst, reports been prescribed tramadol and Motrin, took last dose ~1700 yesterday

## 2019-02-19 NOTE — ED ADULT NURSE NOTE - NSIMPLEMENTINTERV_GEN_ALL_ED
Implemented All Universal Safety Interventions:  [place] to call system. Call bell, personal items and telephone within reach. Instruct patient to call for assistance. Room bathroom lighting operational. Non-slip footwear when patient is off stretcher. Physically safe environment: no spills, clutter or unnecessary equipment. Stretcher in lowest position, wheels locked, appropriate side rails in place.

## 2019-02-19 NOTE — ED ADULT NURSE REASSESSMENT NOTE - NS ED NURSE REASSESS COMMENT FT1
Pt reports giving urin on arrival to ED, refusing to give any more samples, pt requesting PIV be removed, findings reported to MD Ureña, pt PIV remoeved, pt reports will continue to take at home pain medicaiton and f/u with Dr. Valle in am. Pt safely discharged at this time. Pt reports giving urin on arrival to ED, refusing to give any more samples, pt requesting PIV be removed, findings reported to MD Ureña, pt PIV removed, pt reports will continue to take at home pain medication and f/u with Dr. Valle in am. Pt safely discharged at this time, refuse to wait for d/c paperwork, refused discharge VS.

## 2019-05-03 ENCOUNTER — EMERGENCY (EMERGENCY)
Facility: HOSPITAL | Age: 39
LOS: 1 days | Discharge: HOME | End: 2019-05-03
Attending: EMERGENCY MEDICINE
Payer: COMMERCIAL

## 2019-05-03 VITALS
RESPIRATION RATE: 18 BRPM | WEIGHT: 151.9 LBS | OXYGEN SATURATION: 98 % | TEMPERATURE: 98 F | DIASTOLIC BLOOD PRESSURE: 88 MMHG | SYSTOLIC BLOOD PRESSURE: 149 MMHG | HEIGHT: 64 IN | HEART RATE: 74 BPM

## 2019-05-03 DIAGNOSIS — Z98.89 OTHER SPECIFIED POSTPROCEDURAL STATES: Chronic | ICD-10-CM

## 2019-05-03 DIAGNOSIS — Z98.51 TUBAL LIGATION STATUS: Chronic | ICD-10-CM

## 2019-05-03 DIAGNOSIS — Z90.710 ACQUIRED ABSENCE OF BOTH CERVIX AND UTERUS: Chronic | ICD-10-CM

## 2019-05-03 PROCEDURE — 99282 EMERGENCY DEPT VISIT SF MDM: CPT

## 2019-05-03 NOTE — ED STATDOCS - NS ED ROS FT
ROS: (+) fever, nasal congestion, throat pain, ear pain, rhinorrhea, body aches (-) No chills. No eye pain/changes in vision, dysphagia, No chest pain/palpitations. No SOB. No abdominal pain, N/V/D, no black/bloody bm. No dysuria/frequency/discharge, No headache. No Dizziness.    No rashes or breaks in skin. No numbness/tingling/weakness.

## 2019-05-03 NOTE — ED STATDOCS - PHYSICAL EXAMINATION
Gen: No acute distress, non toxic  HEENT: Mucous membranes moist, pink conjunctivae, EOMI  CV: RRR, nl s1/s2.  Resp: CTAB, normal rate and effort  GI: Abdomen soft, NT, ND. No rebound, no guarding  : No CVAT  Neuro: A&O x 3, moving all 4 extremities  MSK: No spine or joint tenderness to palpation  Skin: No rashes. intact and perfused. Gen: No acute distress, non toxic  HEENT: Mucous membranes moist, pink conjunctivae, EOMI. + nasal congestion, patent airway, no exudate, mild pharyngeal erythema. no lad. tm wnl b/l  Neck: supple  CV: RRR, nl s1/s2.  Resp: CTAB, normal rate and effort  GI: Abdomen soft, NT, ND. No rebound, no guarding  : No CVAT  Neuro: A&O x 3, moving all 4 extremities  MSK: No spine or joint tenderness to palpation  Skin: No rashes. intact and perfused.

## 2019-05-03 NOTE — ED STATDOCS - CLINICAL SUMMARY MEDICAL DECISION MAKING FREE TEXT BOX
likely uri, well appering, patent airway, afebrile, non toxic. continue supportive care and return precautions.

## 2019-05-03 NOTE — ED STATDOCS - OBJECTIVE STATEMENT
39 y/o F pt with hx of HTN, and partial hysterectomy presents to the ED c/o throat pain, with assoc. ear pain, nasal congestion, subjective fever, rhinorrhea, and body aches for 5 days. Pt states she felt her throat was on "fire," while at work yesterday; states her throat pain feels different from her prior hx of strep throat. Pt took Zyrtec for her symptoms with slight relief, pt has also been taking Motrin; last dose this AM. Pt is currently on Losartan. NKDA. Denies abdominal pain, vomiting, and diarrhea. Denies past hx of sinus infections. No further complaints at this time.

## 2020-02-20 NOTE — ED ADULT TRIAGE NOTE - BMI (KG/M2)
February 24, 2020         Patient: Chepe Alvarez   YOB: 1955           Chepe Alvarez has been under my care and needs to maintain better limb volume. He has an extensive history related to venous disease including multiple ulcerations. Mr. 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 He recently had a recurrence of stump ulceration along with venous insufficiency. In order to prevent further ulcerations related to venous insufficiency in his stump there would be significant benefit from maintaining limb volume, reduce moisture and improve the fit of the prosthesis. I think the vacuum suspended prosthesis will help in this regard.        Sincerely,         Kurt Tsang MD                                               25.7

## 2020-07-01 NOTE — ED ADULT TRIAGE NOTE - HEIGHT IN CM
Mrs. Wilkinson is a 57 y/o WF who was referred for positive fecal occult test on 6/2/2020 at her gynecologist office. She has never had a colonoscopy for colon cancer screening. She has a family history of colon cancer with her maternal grandfather at the age of 59. There is no history of colon polyps or cancer with first degree relatives. She also has a change in bowel pattern recently. She thinks it may be due to stress. It started at the end of April/early May with ribbon-like stool, sometimes loose, and sticky consistency. There was no melena. Around the beginning of June her bowel pattern started to improve and its nearly back to normal. Other than mildly increase fiber intake, no other changes were done. She has occasional bright red blood in a wipe-type pattern suggestive of hemorrhoids. She had hemorrhoidal banding done several years ago after her second pregnancy with dramatic improvement in hemorrhoidal symptoms of bleeding. Weight is stable. On review of records from her PCP, it appears she had elevated alkaline phosphatase 157 last May. She states she has had elevated liver enzymes in the past. Labs from 4/29/2020 with normal liver enzymes - alkaline phosphatase 134, AST 18, ALT 15. Impression/Plan:BR-Positive occult stool test, needs colonoscopy. BR-Change in bowel pattern, improving. This may be functional. Further evaluation depending on response to increased fiber intake. BR-Elevated alkaline phosphatase, intermittent. Will check GGT and isoenzymes to evaluate whether hepatic in origin. Further evaluation depending on results. If confirmed hepatic in origin, I discussed with her single most likely explanation of fatty liver given her risk factors of hypertension, hypercholesterolemia, obesity.   160.02

## 2020-09-09 NOTE — ED STATDOCS - CCCP TRG CHIEF CMPLNT
Form is ready to be sign by dr Flynn  
Patient dropped of blank disability forms in [place] to be completed. The form has been scanned into chart. Please call him when completed. [phone]  
cold symptoms

## 2020-09-20 ENCOUNTER — EMERGENCY (EMERGENCY)
Facility: HOSPITAL | Age: 40
LOS: 1 days | Discharge: HOME | End: 2020-09-20
Attending: EMERGENCY MEDICINE
Payer: MEDICAID

## 2020-09-20 VITALS
HEIGHT: 64 IN | TEMPERATURE: 98 F | RESPIRATION RATE: 18 BRPM | HEART RATE: 68 BPM | OXYGEN SATURATION: 99 % | DIASTOLIC BLOOD PRESSURE: 93 MMHG | WEIGHT: 134.92 LBS | SYSTOLIC BLOOD PRESSURE: 160 MMHG

## 2020-09-20 DIAGNOSIS — Z98.89 OTHER SPECIFIED POSTPROCEDURAL STATES: Chronic | ICD-10-CM

## 2020-09-20 DIAGNOSIS — Z98.51 TUBAL LIGATION STATUS: Chronic | ICD-10-CM

## 2020-09-20 DIAGNOSIS — Z90.710 ACQUIRED ABSENCE OF BOTH CERVIX AND UTERUS: Chronic | ICD-10-CM

## 2020-09-20 PROCEDURE — 99282 EMERGENCY DEPT VISIT SF MDM: CPT

## 2020-09-20 RX ORDER — HYDROCORTISONE 1 %
1 OINTMENT (GRAM) TOPICAL
Qty: 15 | Refills: 0
Start: 2020-09-20 | End: 2020-09-24

## 2020-09-20 RX ORDER — DIBUCAINE 1 %
1 OINTMENT (GRAM) RECTAL
Qty: 15 | Refills: 0
Start: 2020-09-20 | End: 2020-09-24

## 2020-09-20 RX ORDER — HYDROCHLOROTHIAZIDE 25 MG
0 TABLET ORAL
Qty: 0 | Refills: 0 | DISCHARGE

## 2020-09-20 RX ORDER — LOSARTAN POTASSIUM 100 MG/1
0 TABLET, FILM COATED ORAL
Qty: 0 | Refills: 0 | DISCHARGE

## 2020-09-20 NOTE — ED PROVIDER NOTE - CARE PROVIDER_API CALL
Deshaun Kincaid  COLON/RECTAL SURGERY  [address] [facility], [address] [place] [address]  Phone: [phone]  Fax: [phone]  Follow Up Time:

## 2020-09-20 NOTE — ED PROVIDER NOTE - PATIENT PORTAL LINK FT
You can access the FollowMyHealth Patient Portal offered by [facility] by registering at the following website: [email or web address]. By joining [facility]’s FollowMyHealth portal, you will also be able to view your health information using other applications (apps) compatible with our system.

## 2020-09-20 NOTE — ED PROVIDER NOTE - CLINICAL SUMMARY MEDICAL DECISION MAKING FREE TEXT BOX
41 y/o F with 24 hours of hemorrhoid with pain, no bleeding, no evidence of thrombosis.  Will rx Anusol, dibucaine, sitz baths, miralax and colace, refer to colorectal for f/u.

## 2020-09-20 NOTE — ED PROVIDER NOTE - NSFOLLOWUPINSTRUCTIONS_ED_ALL_ED_FT
1) USE ANUSOL AND DIBUCAINE AS DIRECTED  2) TAKE MIRALAX DAILY AND COLACE 3X/DAY TO SOFTEN STOOL  3) FILL BATHTUB OR LARGE CONTAINER WITH WARM WATER, SIT IN IT FOR 15 MINUTES 5-6X/DAY TO HELP SHRINK HEMORRHOID  4) FOLLOW UP WITH COLORECTAL IF THE HEMORRHOID PERSISTS

## 2020-09-20 NOTE — ED PROVIDER NOTE - OBJECTIVE STATEMENT
39 y/o F presents with c/p hemorrhoid that developed yesterday.  States that she has not been straining at stool or having rectal sex.  Today it is bigger and painful.  No Bleeding.  Has been using preparation h with little relief.

## 2020-09-20 NOTE — ED PROVIDER NOTE - ATTENDING CONTRIBUTION TO CARE
HPI: 39yo F p/w rectal pain x 1 day. No fevers/chills. Tried preparation H and ibuprofen with some relief.     PE:  Gen: NAD  Head: NCAT  HEENT: Oral mucosa moist, normal conjunctiva  CV: normal perfusion  Resp: no respiratory distress  Neuro: No focal neuro deficits  MSK: FROM all 4 extremities, no deformity  Skin: No rash, no bruising  Psych: Normal affect  Rectal: 1cm nonthrombosed external hemorrhoid at 6'oclock position on anus, +TTP     MDM: 39yo F with hemorrhoid- advised to use Sitz baths, anusol, given colorectal surgery f/u. Mitzi Wayne DO     I performed a history and physical exam of the patient and discussed their management with the advanced care provider. I reviewed the advanced care provider's note and agree with the documented findings and plan of care. My medical decision making and objective findings are found above.

## 2021-02-20 ENCOUNTER — EMERGENCY (EMERGENCY)
Facility: HOSPITAL | Age: 41
LOS: 1 days | Discharge: HOME | End: 2021-02-20
Payer: MEDICAID

## 2021-02-20 VITALS
RESPIRATION RATE: 18 BRPM | HEART RATE: 66 BPM | HEIGHT: 64 IN | OXYGEN SATURATION: 97 % | SYSTOLIC BLOOD PRESSURE: 142 MMHG | DIASTOLIC BLOOD PRESSURE: 71 MMHG | TEMPERATURE: 98 F

## 2021-02-20 DIAGNOSIS — Z98.89 OTHER SPECIFIED POSTPROCEDURAL STATES: Chronic | ICD-10-CM

## 2021-02-20 DIAGNOSIS — Z90.710 ACQUIRED ABSENCE OF BOTH CERVIX AND UTERUS: Chronic | ICD-10-CM

## 2021-02-20 DIAGNOSIS — Z98.51 TUBAL LIGATION STATUS: Chronic | ICD-10-CM

## 2021-02-20 LAB — SARS-COV-2 RNA SPEC QL NAA+PROBE: SIGNIFICANT CHANGE UP

## 2021-02-20 PROCEDURE — U0005: CPT

## 2021-02-20 PROCEDURE — 99283 EMERGENCY DEPT VISIT LOW MDM: CPT

## 2021-02-20 PROCEDURE — U0003: CPT

## 2021-02-20 PROCEDURE — 99282 EMERGENCY DEPT VISIT SF MDM: CPT

## 2021-02-20 NOTE — ED STATDOCS - PATIENT PORTAL LINK FT
You can access the FollowMyHealth Patient Portal offered by [facility] by registering at the following website: [email or web address]. By joining [employer]’s FollowMyHealth portal, you will also be able to view your health information using other applications (apps) compatible with our system.

## 2021-02-20 NOTE — ED STATDOCS - PHYSICAL EXAMINATION
General: Awake and alert  Eyes: clear bilaterally  Resp: even and unlabored  skin: warm and dry  psych: appropriate

## 2021-02-20 NOTE — ED STATDOCS - OBJECTIVE STATEMENT
41 y/o F was exposed to covid+ coworker 2 weeks ago, asymptomatic, requesting testing to retiurn to work.

## 2021-02-20 NOTE — ED STATDOCS - NS ED ROS FT
General: no fever or chills  Eyes: no redness or discharge  ENT: no nasal congestion, no sore throat  Cardiac: no CP, no palpitations  Respiratory: No cough, SOB, PINO, wheeze  Abd: no abd pain, N/V/D  Skin: no itch or rash

## 2021-02-25 NOTE — ED POST DISCHARGE NOTE - ADDITIONAL DOCUMENTATION
Pt presented to the ED asking for copy of neg COVID test, pt informed of neg after confirming identification with multiple identifiers.

## 2021-05-25 NOTE — ED STATDOCS - CONSTITUTIONAL APPEARANCE HYGIENE, MLM
Gracie Larson  [birth year]  5/25/21      HPI:    Patient presents for his routine visit. Since last visit he was put on glimepride for his sugar. He admits now his sugars at times going low, in 50s, not often. No neuropathy, no foot problems. Does admit to some polyuria but states has bene like this for many years, see Dr. Miguel Briseno in July. He also admits that over the past year, he has noticed his knees and R hip more weak and bothersome, states in AM he feel strong, then as he is active he seems to get more pain and feels like his legs get more tired. He did stop using weigth lifts for his legs, but still goes to cardiac rehab 3 days weekly. He denies numbness or tingling in legs, no lower back pain, no claudication. Past Medical History:   Diagnosis Date    Biliary stone     CAD (coronary artery disease) 2002    follows with Dr. Frias Rising yearly    Carotid artery stenosis     Dr. Lorena Ojeda per patient    Esophageal stricture     when eats has to eat slow    GERD (gastroesophageal reflux disease)     Hyperlipidemia     Hypertension     Pancreatic stent migration     Pancreatic stones     Pancreatitis     several times    Type 2 diabetes mellitus without complication ([facility] [address]         Past Surgical History:   Procedure Laterality Date    CORONARY ARTERY BYPASS GRAFT  2002    DIAGNOSTIC CARDIAC CATH LAB PROCEDURE  2002    ERCP      HERNIA REPAIR  2003    LITHOTRIPSY      OTHER SURGICAL HISTORY      pancreatic stent x2    UT LAP,CHOLECYSTECTOMY N/A 9/6/2018    CHOLECYSTECTOMY LAPAROSCOPIC ROBOTIC performed by Hema Crabtree MD at [facility] OR       Current Outpatient Medications   Medication Sig Dispense Refill    blood glucose test strips (FREESTYLE LITE) strip 1 each by Other route daily As needed.  50 each 5    JARDIANCE 10 MG tablet TAKE 1 TABLET EVERY DAY 90 tablet 1    metoprolol succinate (TOPROL XL) 50 MG extended release tablet TAKE 1 TABLET EVERY DAY 90 tablet 1    change in position or Valsalva. PMI non-displaced. UE and LE distal pulses intact. Abdomen:  Soft, NTND, NABS x 4, no masses or organomegaly, no rebound or guarding. MS: Normal, painless range of motion. No neurovascular deficit. There was some ttp along the R SI joint, but lumbar spine exam unremarkable otherwise, neg SLR, full ROM of the hips, knees and ankles, there was 4+ strength at the hips and knees with flexion. 5/5 strength in UE's/LE's/ bilaterally otherwise. Skin:  No abrasions, ecchymoses, or lacerations unless noted elsewhere. Extremities  No cyanosis, clubbing, or edema noted. Neurological:   Oriented. Motor functions intact. CN II-XII intact. DTRs UE and LE intact. Assessment/Plan:     [facility] was seen today for 6 month follow-up, hypertension, hyperlipidemia, diabetes and blood work. Diagnoses and all orders for this visit:    Type 2 diabetes mellitus without complication, without long-term current use of insulin ([address] appears to be in better control   -     blood glucose test strips (FREESTYLE LITE) strip; 1 each by Other route daily As needed. -     HEMOGLOBIN A1C; Future  -     MICROALBUMIN / CREATININE URINE RATIO; Future    Weakness of both legs  -     CK; Future  -     CBC; Future   -pt refsues any PT for this, will add back on his leg exercises, and I do feel this should help. But needs to keep in close touch. He refuses anything for the pain. Essential hypertension, controlled. -     COMPREHENSIVE METABOLIC PANEL; Future    Coronary artery disease involving native coronary artery of native heart with angina pectoris ([address] stables see cardiologist.     Hyperlipidemia, unspecified hyperlipidemia type,stable. -     COMPREHENSIVE METABOLIC PANEL; Future  -     LIPID PANEL; Future    Other orders  -     Cancel: PSA SCREENING; Future        Return in about 3 months (around 8/25/2021).         Counseled regarding above diagnosis, including possible risks and complications,  especially if left uncontrolled. Counseled regarding the possible side effects, risks, benefits and alternatives to treatment; patient and/or guardian verbalizes understanding, agrees, feels comfortable with and wishes to proceed with above treatment plan. Advised patient to call with any new medication issues, and read all Rx info from pharmacy to assure aware of all possible risks and side effects of medication before taking. Reviewed age and gender appropriate health screening exams and vaccinations. Advised patient regarding importance of keeping up with recommended health maintenance and to schedule as soon as possible if overdue, as this is important in assessing for undiagnosed pathology, especially cancer, as well as protecting against potentially harmful/life threatening disease. Patient and/or guardian verbalizes understanding and agrees with above counseling, assessment and plan. All questions answered.     Bartolo Greene PA-C well appearing

## 2021-05-25 NOTE — ED ADULT NURSE NOTE - THOUGHTS OF SUICIDE/SELF-HARM YN, MLM
Yes... Airway patent, Nasal mucosa clear. Mouth with normal mucosa. Throat has no vesicles, no oropharyngeal exudates and uvula is midline. No

## 2021-09-12 ENCOUNTER — EMERGENCY (EMERGENCY)
Facility: HOSPITAL | Age: 41
LOS: 1 days | Discharge: HOME | End: 2021-09-12
Attending: EMERGENCY MEDICINE
Payer: MEDICAID

## 2021-09-12 VITALS
RESPIRATION RATE: 18 BRPM | WEIGHT: 145.06 LBS | OXYGEN SATURATION: 98 % | HEIGHT: 64 IN | SYSTOLIC BLOOD PRESSURE: 147 MMHG | HEART RATE: 59 BPM | TEMPERATURE: 98 F | DIASTOLIC BLOOD PRESSURE: 85 MMHG

## 2021-09-12 DIAGNOSIS — Z98.51 TUBAL LIGATION STATUS: Chronic | ICD-10-CM

## 2021-09-12 DIAGNOSIS — Z90.710 ACQUIRED ABSENCE OF BOTH CERVIX AND UTERUS: Chronic | ICD-10-CM

## 2021-09-12 DIAGNOSIS — Z98.89 OTHER SPECIFIED POSTPROCEDURAL STATES: Chronic | ICD-10-CM

## 2021-09-12 LAB
ALBUMIN SERPL ELPH-MCNC: 4.5 G/DL — SIGNIFICANT CHANGE UP (ref 3.3–5.2)
ALP SERPL-CCNC: 63 U/L — SIGNIFICANT CHANGE UP (ref 40–120)
ALT FLD-CCNC: 29 U/L — SIGNIFICANT CHANGE UP
ANION GAP SERPL CALC-SCNC: 13 MMOL/L — SIGNIFICANT CHANGE UP (ref 5–17)
APPEARANCE UR: CLEAR — SIGNIFICANT CHANGE UP
AST SERPL-CCNC: 20 U/L — SIGNIFICANT CHANGE UP
BACTERIA # UR AUTO: NEGATIVE — SIGNIFICANT CHANGE UP
BASOPHILS # BLD AUTO: 0.09 K/UL — SIGNIFICANT CHANGE UP (ref 0–0.2)
BASOPHILS NFR BLD AUTO: 1.1 % — SIGNIFICANT CHANGE UP (ref 0–2)
BILIRUB SERPL-MCNC: <0.2 MG/DL — LOW (ref 0.4–2)
BILIRUB UR-MCNC: NEGATIVE — SIGNIFICANT CHANGE UP
BUN SERPL-MCNC: 12.1 MG/DL — SIGNIFICANT CHANGE UP (ref 8–20)
CALCIUM SERPL-MCNC: 9.2 MG/DL — SIGNIFICANT CHANGE UP (ref 8.6–10.2)
CHLORIDE SERPL-SCNC: 102 MMOL/L — SIGNIFICANT CHANGE UP (ref 98–107)
CO2 SERPL-SCNC: 25 MMOL/L — SIGNIFICANT CHANGE UP (ref 22–29)
COLOR SPEC: YELLOW — SIGNIFICANT CHANGE UP
CREAT SERPL-MCNC: 0.61 MG/DL — SIGNIFICANT CHANGE UP (ref 0.5–1.3)
DIFF PNL FLD: NEGATIVE — SIGNIFICANT CHANGE UP
EOSINOPHIL # BLD AUTO: 0.18 K/UL — SIGNIFICANT CHANGE UP (ref 0–0.5)
EOSINOPHIL NFR BLD AUTO: 2.3 % — SIGNIFICANT CHANGE UP (ref 0–6)
EPI CELLS # UR: NEGATIVE — SIGNIFICANT CHANGE UP
GLUCOSE SERPL-MCNC: 103 MG/DL — HIGH (ref 70–99)
GLUCOSE UR QL: NEGATIVE MG/DL — SIGNIFICANT CHANGE UP
HCG SERPL-ACNC: <4 MIU/ML — SIGNIFICANT CHANGE UP
HCT VFR BLD CALC: 42.4 % — SIGNIFICANT CHANGE UP (ref 34.5–45)
HGB BLD-MCNC: 13.7 G/DL — SIGNIFICANT CHANGE UP (ref 11.5–15.5)
IMM GRANULOCYTES NFR BLD AUTO: 0.5 % — SIGNIFICANT CHANGE UP (ref 0–1.5)
KETONES UR-MCNC: NEGATIVE — SIGNIFICANT CHANGE UP
LEUKOCYTE ESTERASE UR-ACNC: NEGATIVE — SIGNIFICANT CHANGE UP
LYMPHOCYTES # BLD AUTO: 1.88 K/UL — SIGNIFICANT CHANGE UP (ref 1–3.3)
LYMPHOCYTES # BLD AUTO: 23.7 % — SIGNIFICANT CHANGE UP (ref 13–44)
MCHC RBC-ENTMCNC: 29.2 PG — SIGNIFICANT CHANGE UP (ref 27–34)
MCHC RBC-ENTMCNC: 32.3 GM/DL — SIGNIFICANT CHANGE UP (ref 32–36)
MCV RBC AUTO: 90.4 FL — SIGNIFICANT CHANGE UP (ref 80–100)
MONOCYTES # BLD AUTO: 0.83 K/UL — SIGNIFICANT CHANGE UP (ref 0–0.9)
MONOCYTES NFR BLD AUTO: 10.5 % — SIGNIFICANT CHANGE UP (ref 2–14)
NEUTROPHILS # BLD AUTO: 4.9 K/UL — SIGNIFICANT CHANGE UP (ref 1.8–7.4)
NEUTROPHILS NFR BLD AUTO: 61.9 % — SIGNIFICANT CHANGE UP (ref 43–77)
NITRITE UR-MCNC: NEGATIVE — SIGNIFICANT CHANGE UP
PH UR: 6.5 — SIGNIFICANT CHANGE UP (ref 5–8)
PLATELET # BLD AUTO: 302 K/UL — SIGNIFICANT CHANGE UP (ref 150–400)
POTASSIUM SERPL-MCNC: 4.2 MMOL/L — SIGNIFICANT CHANGE UP (ref 3.5–5.3)
POTASSIUM SERPL-SCNC: 4.2 MMOL/L — SIGNIFICANT CHANGE UP (ref 3.5–5.3)
PROT SERPL-MCNC: 7.4 G/DL — SIGNIFICANT CHANGE UP (ref 6.6–8.7)
PROT UR-MCNC: 30 MG/DL
RBC # BLD: 4.69 M/UL — SIGNIFICANT CHANGE UP (ref 3.8–5.2)
RBC # FLD: 12.5 % — SIGNIFICANT CHANGE UP (ref 10.3–14.5)
RBC CASTS # UR COMP ASSIST: NEGATIVE /HPF — SIGNIFICANT CHANGE UP (ref 0–4)
SODIUM SERPL-SCNC: 140 MMOL/L — SIGNIFICANT CHANGE UP (ref 135–145)
SP GR SPEC: 1 — LOW (ref 1.01–1.02)
UROBILINOGEN FLD QL: NEGATIVE MG/DL — SIGNIFICANT CHANGE UP
WBC # BLD: 7.92 K/UL — SIGNIFICANT CHANGE UP (ref 3.8–10.5)
WBC # FLD AUTO: 7.92 K/UL — SIGNIFICANT CHANGE UP (ref 3.8–10.5)
WBC UR QL: NEGATIVE — SIGNIFICANT CHANGE UP

## 2021-09-12 PROCEDURE — 74177 CT ABD & PELVIS W/CONTRAST: CPT | Mod: 26,MD

## 2021-09-12 PROCEDURE — 74177 CT ABD & PELVIS W/CONTRAST: CPT | Mod: MD

## 2021-09-12 PROCEDURE — 99284 EMERGENCY DEPT VISIT MOD MDM: CPT

## 2021-09-12 PROCEDURE — 96374 THER/PROPH/DIAG INJ IV PUSH: CPT | Mod: XU

## 2021-09-12 PROCEDURE — 99284 EMERGENCY DEPT VISIT MOD MDM: CPT | Mod: 25

## 2021-09-12 PROCEDURE — 96375 TX/PRO/DX INJ NEW DRUG ADDON: CPT

## 2021-09-12 RX ORDER — IBUPROFEN 200 MG
1 TABLET ORAL
Qty: 21 | Refills: 0
Start: 2021-09-12 | End: 2021-09-18

## 2021-09-12 RX ORDER — ONDANSETRON 8 MG/1
4 TABLET, FILM COATED ORAL ONCE
Refills: 0 | Status: COMPLETED | OUTPATIENT
Start: 2021-09-12 | End: 2021-09-12

## 2021-09-12 RX ORDER — METHOCARBAMOL 500 MG/1
1500 TABLET, FILM COATED ORAL ONCE
Refills: 0 | Status: COMPLETED | OUTPATIENT
Start: 2021-09-12 | End: 2021-09-12

## 2021-09-12 RX ORDER — ACETAMINOPHEN 500 MG
650 TABLET ORAL ONCE
Refills: 0 | Status: COMPLETED | OUTPATIENT
Start: 2021-09-12 | End: 2021-09-12

## 2021-09-12 RX ORDER — KETOROLAC TROMETHAMINE 30 MG/ML
30 SYRINGE (ML) INJECTION ONCE
Refills: 0 | Status: DISCONTINUED | OUTPATIENT
Start: 2021-09-12 | End: 2021-09-12

## 2021-09-12 RX ORDER — SODIUM CHLORIDE 9 MG/ML
1000 INJECTION INTRAMUSCULAR; INTRAVENOUS; SUBCUTANEOUS ONCE
Refills: 0 | Status: COMPLETED | OUTPATIENT
Start: 2021-09-12 | End: 2021-09-12

## 2021-09-12 RX ADMIN — ONDANSETRON 4 MILLIGRAM(S): 8 TABLET, FILM COATED ORAL at 12:13

## 2021-09-12 RX ADMIN — Medication 30 MILLIGRAM(S): at 10:01

## 2021-09-12 RX ADMIN — SODIUM CHLORIDE 1000 MILLILITER(S): 9 INJECTION INTRAMUSCULAR; INTRAVENOUS; SUBCUTANEOUS at 10:01

## 2021-09-12 RX ADMIN — Medication 650 MILLIGRAM(S): at 12:13

## 2021-09-12 RX ADMIN — METHOCARBAMOL 1500 MILLIGRAM(S): 500 TABLET, FILM COATED ORAL at 10:30

## 2021-09-12 NOTE — ED PROVIDER NOTE - CLINICAL SUMMARY MEDICAL DECISION MAKING FREE TEXT BOX
female hx of kidney stones presenting with left flank pain and urinary symptoms. mild sciatica symptoms. + left LLQ and left CVAT on examination. labs fluids pain control and ct

## 2021-09-12 NOTE — ED ADULT TRIAGE NOTE - CHIEF COMPLAINT QUOTE
back pain starting yesterday. pt ambulatory. worse on the left side now radiating down left leg. denies trauma

## 2021-09-12 NOTE — ED PROVIDER NOTE - MUSCULOSKELETAL, MLM
Spine appears normal, no midline tenderness no point vertebral tenderness straight leg raise bialterally. range of motion is not limited, no muscle or joint tenderness

## 2021-09-12 NOTE — ED PROVIDER NOTE - CARE PROVIDER_API CALL
Delgado Brunner)  Urology  [address], [address]  [place] [address]  Phone: [phone]  Fax: [phone]  Follow Up Time: 4-6 Days

## 2021-09-12 NOTE — ED PROVIDER NOTE - PATIENT PORTAL LINK FT
You can access the FollowMyHealth Patient Portal offered by [facility] by registering at the following website: [email or web address]. By joining [employer]’s FollowMyHealth portal, you will also be able to view your health information using other applications (apps) compatible with our system.

## 2021-09-12 NOTE — ED PROVIDER NOTE - NSICDXPASTSURGICALHX_GEN_ALL_CORE_FT
PAST SURGICAL HISTORY:  H/O tubal ligation     S/P abdominoplasty 3/2015    S/P hysterectomy     S/P tonsillectomy

## 2021-09-12 NOTE — ED PROVIDER NOTE - NSICDXFAMILYHX_GEN_ALL_CORE_FT
FAMILY HISTORY:  Mother  Still living? Yes, Estimated age: 51-60  Family history of myocardial infarction at age less than 60, Age at diagnosis: Age Unknown  Family history of premature CAD, Age at diagnosis: Age Unknown

## 2021-09-12 NOTE — ED PROVIDER NOTE - NSFOLLOWUPINSTRUCTIONS_ED_ALL_ED_FT
Kidney Stones    Kidney stones (urolithiasis) are crystal deposits that form inside your kidneys. Pain is caused by the stone moving through the urinary tract, causing spasms of the ureter. Drink enough water and fluids to keep your urine clear or pale yellow. This will help you to pass the stone or stone fragments. If provided a strainer, strain all urine and keep all particulate matter and stones for a follow up appointment with a urologist.    SEEK IMMEDIATE MEDICAL CARE IF YOU HAVE ANY OF THE FOLLOWING SYMPTOMS: pain not controlled with medication, fever/chills, worsening vomiting, inability to urinate, or dizziness/lightheadedness.    Back Pain    Back pain is very common in adults. The cause of back pain is rarely dangerous and the pain often gets better over time. The cause of your back pain may not be known and may include strain of muscles or ligaments, degeneration of the spinal disks, or arthritis. Occasionally the pain may radiate down your leg(s). Over-the-counter medicines to reduce pain and inflammation are often the most helpful. Stretching and remaining active frequently helps the healing process.     SEEK IMMEDIATE MEDICAL CARE IF YOU HAVE ANY OF THE FOLLOWING SYMPTOMS: bowel or bladder control problems, unusual weakness or numbness in your arms or legs, nausea or vomiting, abdominal pain, fever, dizziness/lightheadedness.

## 2021-09-13 LAB
CULTURE RESULTS: SIGNIFICANT CHANGE UP
SPECIMEN SOURCE: SIGNIFICANT CHANGE UP

## 2021-09-24 ENCOUNTER — NON-APPOINTMENT (OUTPATIENT)
Age: 41
End: 2021-09-24

## 2021-09-24 ENCOUNTER — APPOINTMENT (OUTPATIENT)
Dept: UROLOGY | Facility: CLINIC | Age: 41
End: 2021-09-24
Payer: MEDICAID

## 2021-09-24 VITALS
WEIGHT: 172 LBS | BODY MASS INDEX: 29.37 KG/M2 | SYSTOLIC BLOOD PRESSURE: 169 MMHG | DIASTOLIC BLOOD PRESSURE: 104 MMHG | HEIGHT: 64 IN | HEART RATE: 59 BPM

## 2021-09-24 DIAGNOSIS — M54.5 LOW BACK PAIN: ICD-10-CM

## 2021-09-24 PROCEDURE — 99204 OFFICE O/P NEW MOD 45 MIN: CPT

## 2021-09-24 NOTE — PHYSICAL EXAM
[General Appearance - Well Developed] : well developed [General Appearance - Well Nourished] : well nourished [Normal Appearance] : normal appearance [Well Groomed] : well groomed [General Appearance - In No Acute Distress] : no acute distress [] : no respiratory distress [Edema] : no peripheral edema [Respiration, Rhythm And Depth] : normal respiratory rhythm and effort [Exaggerated Use Of Accessory Muscles For Inspiration] : no accessory muscle use [Abdomen Soft] : soft [Abdomen Tenderness] : non-tender [Normal Station and Gait] : the gait and station were normal for the patient's age [Oriented To Time, Place, And Person] : oriented to person, place, and time [Affect] : the affect was normal [Mood] : the mood was normal [Not Anxious] : not anxious [FreeTextEntry1] : see HPI

## 2021-09-24 NOTE — HISTORY OF PRESENT ILLNESS
[None] : no symptoms [1] : 1 [FreeTextEntry1] : 40 yo F for initial consultation\par no PMH or PSH\par NKDA\par \par no history of nephrolithiasis\par was seen in the ED due to flank pain\par - CT confirmed no renal or ureteral stones, had some left kidney fullness at the time\par - urinalysis - normal\par \par later complained of some low back pain that radiated bilaterally and changes with position\par now feeling better with the pain almost gone\par on exam has some very localized musculoskeletal pain on the left back \par no history of any trauma, and she is feeling much better\par \par reviewed the CT with patient, discussed the report and findings\par discussed the possibility she might have passed a small stone, but it might all be musculoskeletal pain\par no need for metabolic evaluation at this point\par no need for further urologic follow up

## 2022-07-11 NOTE — ED ADULT TRIAGE NOTE - BSA (M2)
Patient presents today for a colposcopy      Allergies as of 2022 - Reviewed 2022   Allergen Reaction Noted   • Demerol CARDIAC DISTURBANCES 2007     Medications verified and updated as needed.  Social History     Tobacco Use   Smoking Status Former Smoker   • Years: 2.00   • Types: Cigarettes   • Quit date: 3/9/2009   • Years since quittin.3   Smokeless Tobacco Never Used   Tobacco Comment    1 cigarette/day        Health Maintenance Due   Topic Date Due   • COVID-19 Vaccine (3 - Booster for Pfizer series) 2021                     
This office note has been dictated.  
1.71
20

## 2022-09-09 ENCOUNTER — EMERGENCY (EMERGENCY)
Facility: HOSPITAL | Age: 42
LOS: 1 days | Discharge: HOME | End: 2022-09-09
Attending: EMERGENCY MEDICINE
Payer: MEDICAID

## 2022-09-09 VITALS
HEIGHT: 64 IN | WEIGHT: 154.98 LBS | HEART RATE: 72 BPM | RESPIRATION RATE: 18 BRPM | OXYGEN SATURATION: 97 % | TEMPERATURE: 98 F | DIASTOLIC BLOOD PRESSURE: 92 MMHG | SYSTOLIC BLOOD PRESSURE: 132 MMHG

## 2022-09-09 DIAGNOSIS — Z98.89 OTHER SPECIFIED POSTPROCEDURAL STATES: Chronic | ICD-10-CM

## 2022-09-09 DIAGNOSIS — Z90.710 ACQUIRED ABSENCE OF BOTH CERVIX AND UTERUS: Chronic | ICD-10-CM

## 2022-09-09 DIAGNOSIS — Z98.51 TUBAL LIGATION STATUS: Chronic | ICD-10-CM

## 2022-09-09 PROCEDURE — 99283 EMERGENCY DEPT VISIT LOW MDM: CPT

## 2022-09-09 PROCEDURE — 99284 EMERGENCY DEPT VISIT MOD MDM: CPT | Mod: 25

## 2022-09-09 PROCEDURE — 10060 I&D ABSCESS SIMPLE/SINGLE: CPT

## 2022-09-09 RX ORDER — OXYCODONE AND ACETAMINOPHEN 5; 325 MG/1; MG/1
2 TABLET ORAL ONCE
Refills: 0 | Status: DISCONTINUED | OUTPATIENT
Start: 2022-09-09 | End: 2022-09-09

## 2022-09-09 RX ADMIN — Medication 100 MILLIGRAM(S): at 15:41

## 2022-09-09 RX ADMIN — OXYCODONE AND ACETAMINOPHEN 2 TABLET(S): 5; 325 TABLET ORAL at 15:41

## 2022-09-09 NOTE — ED ADULT NURSE NOTE - OBJECTIVE STATEMENT
Pt presents to ed c/o abscess on left labia since yesterday. Pt noticed it via pain after riding bicycle. Pt states hurts when moving or something touches it. Pt recently sexually active again.

## 2022-09-09 NOTE — ED PROVIDER NOTE - NSFOLLOWUPINSTRUCTIONS_ED_ALL_ED_FT
take antibiotics as prescribed.  Use warm compresses for 5 to 10 minutes every few hours for the next 2 days.  Tylenol extra strength 2 tablets every 6 hours or ibuprofen 600 mg every 6 hours for pain.  Follow-up with GYN as scheduled next week.  Return to the emergency department if worsening

## 2022-09-09 NOTE — ED ADULT NURSE NOTE - HIV OFFER
[address] [place] [address] Court  eMERGENCY dEPARTMENT eNCOUnter      Pt Name: Amanda Child  MRN: [id number]  [place] 2009  Date of evaluation: 6/25/2021  Provider: Randy Lind, [address] [place] [address]       Chief Complaint   Patient presents with    Rash     here with c/o an itchy rash all over body. HISTORY OF PRESENT ILLNESS   (Location/Symptom, Timing/Onset, Context/Setting, Quality, Duration, Modifying Factors, Severity)  Note limiting factors. Amanda Child is a 13 y.o. female who presents to the emergency department for itching all over. Family states that yesterday evening, she was using a different  to clean carpet and then vacuumed the room. She stayed with her sister and they were riding around trying to get the baby to sleep. She said that she started itching on her legs and now with rash all over her lower extremities, bilateral arms with some redness to her cheeks. No facial/lip/throat swelling. No trouble breathing. No change to detergents, soaps, clothing, etc. No history of reactions. No poison ivy or other plant exposure. Nursing Notes were reviewed. REVIEW OF SYSTEMS    (2-9 systems for level 4, 10 or more forlevel 5)     Review of Systems   Skin: Positive for color change and rash. All other systems reviewed and are negative. PAST MEDICAL HISTORY     Past Medical History:   Diagnosis Date    Asthma     Amarilis's bone disease     Leaky heart valve     Seasonal allergies          SURGICAL HISTORY       Past Surgical History:   Procedure Laterality Date    TONSILLECTOMY           CURRENT MEDICATIONS       Previous Medications    DICYCLOMINE (BENTYL) 20 MG TABLET    Take 1 tablet by mouth 3 times daily as needed (Abdominal pain)    ONDANSETRON (ZOFRAN ODT) 4 MG DISINTEGRATING TABLET    Take 1 tablet by mouth every 8 hours as needed for Nausea or Vomiting       ALLERGIES     Patient has no known allergies.     FAMILY HISTORY History reviewed. No pertinent family history. SOCIAL HISTORY       Social History     Socioeconomic History    Marital status: Single     Spouse name: None    Number of children: None    Years of education: None    Highest education level: None   Occupational History    None   Tobacco Use    Smoking status: Never Smoker   Substance and Sexual Activity    Alcohol use: None    Drug use: None    Sexual activity: None   Other Topics Concern    None   Social History Narrative    None     Social Determinants of Health     Financial Resource Strain:     Difficulty of Paying Living Expenses:    Food Insecurity:     Worried About Running Out of Food in the Last Year:     Ran Out of Food in the Last Year:    Transportation Needs:     Lack of Transportation (Medical):  Lack of Transportation (Non-Medical):    Physical Activity:     Days of Exercise per Week:     Minutes of Exercise per Session:    Stress:     Feeling of Stress :    Social Connections:     Frequency of Communication with Friends and Family:     Frequency of Social Gatherings with Friends and Family:     Attends Rastafarian Services:     Active Member of Clubs or Organizations:     Attends Club or Organization Meetings:     Marital Status:    Intimate Partner Violence:     Fear of Current or Ex-Partner:     Emotionally Abused:     Physically Abused:     Sexually Abused:        SCREENINGS             PHYSICAL EXAM    (up to 7 for level 4, 8 or more for level 5)     ED Triage Vitals [06/25/21 0533]   BP Temp Temp Source Heart Rate Resp SpO2 Height Weight - Scale   (!) 138/93 99.1 °F (37.3 °C) Oral 102 16 98 % 5' 6\" (1.676 m) (!) 262 lb (118.8 kg)       Physical Exam  Vitals and nursing note reviewed. Constitutional:       General: She is not in acute distress. Appearance: She is obese. HENT:      Head: Normocephalic. Mouth/Throat:      Mouth: Mucous membranes are moist.      Pharynx: Oropharynx is clear.    Eyes: Extraocular Movements: Extraocular movements intact. Conjunctiva/sclera: Conjunctivae normal.      Pupils: Pupils are equal, round, and reactive to light. Cardiovascular:      Rate and Rhythm: Normal rate and regular rhythm. Heart sounds: Normal heart sounds. Pulmonary:      Effort: Pulmonary effort is normal.      Breath sounds: Normal breath sounds. No wheezing. Musculoskeletal:         General: Normal range of motion. Cervical back: Neck supple. Skin:     General: Skin is warm and dry. Findings: Rash present. Neurological:      Mental Status: She is alert and oriented to person, place, and time. Psychiatric:         Mood and Affect: Mood normal.         Behavior: Behavior normal.         DIAGNOSTIC RESULTS     EKG: All EKG's are interpreted by the Emergency Department Physician who either signs or Co-signs this chart in the absence of a cardiologist.    None    RADIOLOGY:   Non-plain film images such as CT, Ultrasound and MRI are read by the radiologist. Plain radiographic images are visualized andpreliminarily interpreted by the emergency physician with the below findings:    None    Interpretationper the Radiologist below, if available at the time of this note:    No orders to display         ED BEDSIDE ULTRASOUND:   Performed by ED Physician - none    LABS:  Labs Reviewed - No data to display    All other labs were within normal range or not returned as of this dictation. EMERGENCY DEPARTMENT COURSE and DIFFERENTIAL DIAGNOSIS/MDM:   Vitals:    Vitals:    06/25/21 0533   BP: (!) 138/93   Pulse: 102   Resp: 16   Temp: 99.1 °F (37.3 °C)   TempSrc: Oral   SpO2: 98%   Weight: (!) 262 lb (118.8 kg)   Height: 5' 6\" (1.676 m)           CRITICAL CARE TIME   Total Critical Care time was 0 minutes, excluding separatelyreportable procedures.   There was a high probability ofclinically significant/life threatening deterioration in the patient's condition which required my urgent Previously Declined (within the last year)

## 2022-09-09 NOTE — ED PROVIDER NOTE - OBJECTIVE STATEMENT
Notes a lump to labia that started yesterday morning.  Took Motrin later in the evening and had pain relief for short period of time.  Reports that pain has increased and lump is getting bigger.  Denies fever.  Denies vaginal discharge.  Denies dysuria.  Denies history of urinary tract infection or history of sexually transmitted illness. arranged appointment with GYN for next Wednesday but reports difficulty wiping self and increased pain

## 2022-09-09 NOTE — ED PROVIDER NOTE - PATIENT PORTAL LINK FT
You can access the FollowMyHealth Patient Portal offered by [facility] by registering at the following website: [email or web address]. By joining [employer]’s FollowMyHealth portal, you will also be able to view your health information using other applications (apps) compatible with our system.

## 2022-09-09 NOTE — ED PROVIDER NOTE - PHYSICAL EXAMINATION
Nontoxic-appearing, no acute distress.  Abdomen soft nontender, no CVA tenderness pelvic examination as per PA.

## 2022-09-09 NOTE — ED PROVIDER NOTE - ATTENDING APP SHARED VISIT CONTRIBUTION OF CARE
I, Mark Sterling, performed the initial face to face bedside interview with this patient regarding history of present illness, review of symptoms and relevant past medical, social and family history.  I completed an independent physical examination.  I was the provider who initially evaluated this patient.  Follow-up on ordered tests (ie labs, radiologic studies) and re-evaluation of the patient's status has been communicated to the ACP.  Disposition of the patient will be based on test outcome and response to ED interventions.

## 2022-09-10 ENCOUNTER — EMERGENCY (EMERGENCY)
Facility: HOSPITAL | Age: 42
LOS: 1 days | Discharge: HOME | End: 2022-09-10
Attending: EMERGENCY MEDICINE
Payer: MEDICAID

## 2022-09-10 VITALS
HEART RATE: 71 BPM | OXYGEN SATURATION: 97 % | RESPIRATION RATE: 18 BRPM | SYSTOLIC BLOOD PRESSURE: 137 MMHG | DIASTOLIC BLOOD PRESSURE: 80 MMHG | WEIGHT: 154.98 LBS | TEMPERATURE: 98 F | HEIGHT: 64 IN

## 2022-09-10 DIAGNOSIS — Z98.51 TUBAL LIGATION STATUS: Chronic | ICD-10-CM

## 2022-09-10 DIAGNOSIS — Z90.710 ACQUIRED ABSENCE OF BOTH CERVIX AND UTERUS: Chronic | ICD-10-CM

## 2022-09-10 DIAGNOSIS — Z98.89 OTHER SPECIFIED POSTPROCEDURAL STATES: Chronic | ICD-10-CM

## 2022-09-10 PROCEDURE — 99282 EMERGENCY DEPT VISIT SF MDM: CPT

## 2022-09-10 PROCEDURE — 99283 EMERGENCY DEPT VISIT LOW MDM: CPT

## 2022-09-10 NOTE — ED PROVIDER NOTE - OBJECTIVE STATEMENT
42F presents to the ED for wound check. Pt is s/p left labial abscess I&D performed in the ED yesterday. Pt reports pressure/pain has moderately improved, but she noticed mild amount of blood stain on her underwear this morning which left her concerned. Pt otherwise denies fever/chills, c/p, sob, abd pain, n/v/c/d, dysuria, numbness/tingling/weakness and has no other complaints at this time. 42 year old F presents to the ED for wound check. Pt is s/p left labial abscess I&D performed in the ED yesterday. Pt reports pressure/pain has moderately improved, but she noticed mild amount of blood stain on her underwear this morning which left her concerned. Pt otherwise denies fever/chills, c/p, sob, abd pain, n/v/c/d, dysuria, numbness/tingling/weakness and has no other complaints at this time.

## 2022-09-10 NOTE — ED PROVIDER NOTE - NSFOLLOWUPINSTRUCTIONS_ED_ALL_ED_FT
Abscess    An abscess is an infected area that contains a collection of pus and debris. It can occur in almost any part of the body and occurs when the tissue gets infection. Symptoms include a painful mass that is red, warm, tender that might break open and HAVE drainage. If your health care provider gave you antibiotics make sure to take the full course and do not stop even if feeling better.     SEEK IMMEDIATE MEDICAL CARE IF YOU HAVE ANY OF THE FOLLOWING SYMPTOMS: chills, fever, muscle aches, or red streaking from the area.     Apply warm compresses. Return to the ED for any worsening symptoms, fever/chills.

## 2022-09-10 NOTE — ED PROVIDER NOTE - GENITOURINARY, MLM
+ left superior labial swelling with mild erythema. No palpable fluctuance or induration noted. No active bleeding noted.

## 2022-09-10 NOTE — ED PROVIDER NOTE - PROGRESS NOTE DETAILS
Pt has appt with her OB/GYN this Wednesday. Pt reports mild relief of pain/symptoms after I&D performed yesterday. Instructed patient to apply warm compresses and follow up with OPGYN at scheduled appt. Return for any worsening symptoms or fever/chills.

## 2022-09-10 NOTE — ED PROVIDER NOTE - PATIENT PORTAL LINK FT
You can access the FollowMyHealth Patient Portal offered by [facility] by registering at the following website: [email or web address]. By joining [email or web address]’s FollowMyHealth portal, you will also be able to view your health information using other applications (apps) compatible with our system.

## 2022-12-13 NOTE — ED ADULT TRIAGE NOTE - CADM TRG TX PRIOR TO ARRIVAL
tylenol Mirvaso Counseling: Mirvaso is a topical medication which can decrease superficial blood flow where applied. Side effects are uncommon and include stinging, redness and allergic reactions.

## 2024-01-03 NOTE — ED STATDOCS - CONDITION AT DISCHARGE:
[de-identified] : Fever [FreeTextEntry6] : x4 days. Tmax 40C. Ongoing congestion and cough. No ear tugging. Threw up a few times 4 days ago and has vomited at least 1x/day since then. Diarrhea for the last 3 days, stool today was more normal. [place] traveling [place], returned yesterday. Satisfactory

## 2024-03-11 NOTE — ED ADULT NURSE NOTE - AS SC BRADEN ACTIVITY
Telemed self reported:  Gen: no distress. Eyes: no jaundice. Mouth: no thrush. CV: no chest pain. Resp: no wheezes. Abd: no pain. Ext: no edema.	 Neuro: no weakness.
(4) walks frequently

## 2024-11-19 NOTE — H&P PST ADULT - NSALCOHOLFREQ_GEN_A_CORE_SD
Patient requests all Lab, Cardiology, and Radiology Results on their Discharge Instructions
monthly or less
